# Patient Record
Sex: MALE | Race: WHITE | Employment: FULL TIME | URBAN - METROPOLITAN AREA
[De-identification: names, ages, dates, MRNs, and addresses within clinical notes are randomized per-mention and may not be internally consistent; named-entity substitution may affect disease eponyms.]

---

## 2017-03-06 ENCOUNTER — OFFICE VISIT (OUTPATIENT)
Dept: BEHAVIORAL/MENTAL HEALTH | Age: 32
End: 2017-03-06

## 2017-03-06 DIAGNOSIS — F33.2 MAJOR DEPRESSIVE DISORDER, RECURRENT EPISODE, SEVERE WITH ANXIOUS DISTRESS (HCC): Primary | ICD-10-CM

## 2017-03-06 DIAGNOSIS — F14.20 COCAINE USE DISORDER, MODERATE, DEPENDENCE (HCC): ICD-10-CM

## 2017-03-06 DIAGNOSIS — F12.10 CANNABIS USE DISORDER, MILD, ABUSE: ICD-10-CM

## 2017-03-06 PROCEDURE — 90834 PSYTX W PT 45 MINUTES: CPT | Performed by: PSYCHOLOGIST

## 2017-03-06 PROCEDURE — G0444 DEPRESSION SCREEN ANNUAL: HCPCS | Performed by: PSYCHOLOGIST

## 2017-03-06 ASSESSMENT — PATIENT HEALTH QUESTIONNAIRE - PHQ9
SUM OF ALL RESPONSES TO PHQ QUESTIONS 1-9: 20
9. THOUGHTS THAT YOU WOULD BE BETTER OFF DEAD, OR OF HURTING YOURSELF: 1
8. MOVING OR SPEAKING SO SLOWLY THAT OTHER PEOPLE COULD HAVE NOTICED. OR THE OPPOSITE, BEING SO FIGETY OR RESTLESS THAT YOU HAVE BEEN MOVING AROUND A LOT MORE THAN USUAL: 1
3. TROUBLE FALLING OR STAYING ASLEEP: 3
7. TROUBLE CONCENTRATING ON THINGS, SUCH AS READING THE NEWSPAPER OR WATCHING TELEVISION: 3
6. FEELING BAD ABOUT YOURSELF - OR THAT YOU ARE A FAILURE OR HAVE LET YOURSELF OR YOUR FAMILY DOWN: 2
SUM OF ALL RESPONSES TO PHQ9 QUESTIONS 1 & 2: 6
1. LITTLE INTEREST OR PLEASURE IN DOING THINGS: 3
4. FEELING TIRED OR HAVING LITTLE ENERGY: 1
2. FEELING DOWN, DEPRESSED OR HOPELESS: 3
5. POOR APPETITE OR OVEREATING: 3
10. IF YOU CHECKED OFF ANY PROBLEMS, HOW DIFFICULT HAVE THESE PROBLEMS MADE IT FOR YOU TO DO YOUR WORK, TAKE CARE OF THINGS AT HOME, OR GET ALONG WITH OTHER PEOPLE: 3

## 2017-03-13 ENCOUNTER — OFFICE VISIT (OUTPATIENT)
Dept: BEHAVIORAL/MENTAL HEALTH | Age: 32
End: 2017-03-13

## 2017-03-13 ENCOUNTER — OFFICE VISIT (OUTPATIENT)
Dept: FAMILY MEDICINE CLINIC | Age: 32
End: 2017-03-13

## 2017-03-13 VITALS
BODY MASS INDEX: 23.98 KG/M2 | HEIGHT: 72 IN | DIASTOLIC BLOOD PRESSURE: 72 MMHG | WEIGHT: 177 LBS | SYSTOLIC BLOOD PRESSURE: 100 MMHG | HEART RATE: 76 BPM | RESPIRATION RATE: 16 BRPM

## 2017-03-13 DIAGNOSIS — F10.20 ALCOHOLIC (HCC): ICD-10-CM

## 2017-03-13 DIAGNOSIS — F33.2 MAJOR DEPRESSIVE DISORDER, RECURRENT EPISODE, SEVERE WITH ANXIOUS DISTRESS (HCC): Primary | ICD-10-CM

## 2017-03-13 DIAGNOSIS — F19.94 SUBSTANCE INDUCED MOOD DISORDER (HCC): ICD-10-CM

## 2017-03-13 PROBLEM — F10.90 ALCOHOL USE DISORDER: Status: ACTIVE | Noted: 2017-03-13

## 2017-03-13 PROCEDURE — 99214 OFFICE O/P EST MOD 30 MIN: CPT | Performed by: NURSE PRACTITIONER

## 2017-03-13 PROCEDURE — 90832 PSYTX W PT 30 MINUTES: CPT | Performed by: PSYCHOLOGIST

## 2017-03-13 RX ORDER — DIVALPROEX SODIUM 250 MG/1
250 TABLET, EXTENDED RELEASE ORAL DAILY
Qty: 30 TABLET | Refills: 11 | Status: SHIPPED | OUTPATIENT
Start: 2017-03-13 | End: 2017-10-18 | Stop reason: SDUPTHER

## 2017-03-13 ASSESSMENT — ENCOUNTER SYMPTOMS
ABDOMINAL PAIN: 0
SHORTNESS OF BREATH: 0
COUGH: 0
NAUSEA: 0

## 2017-10-18 ENCOUNTER — OFFICE VISIT (OUTPATIENT)
Dept: FAMILY MEDICINE CLINIC | Age: 32
End: 2017-10-18

## 2017-10-18 VITALS
HEIGHT: 72 IN | RESPIRATION RATE: 16 BRPM | DIASTOLIC BLOOD PRESSURE: 72 MMHG | WEIGHT: 183.9 LBS | HEART RATE: 70 BPM | SYSTOLIC BLOOD PRESSURE: 110 MMHG | BODY MASS INDEX: 24.91 KG/M2 | TEMPERATURE: 98.3 F

## 2017-10-18 DIAGNOSIS — F41.9 ANXIETY: Primary | ICD-10-CM

## 2017-10-18 DIAGNOSIS — F19.94 SUBSTANCE INDUCED MOOD DISORDER (HCC): ICD-10-CM

## 2017-10-18 PROCEDURE — 99213 OFFICE O/P EST LOW 20 MIN: CPT | Performed by: NURSE PRACTITIONER

## 2017-10-18 RX ORDER — DIVALPROEX SODIUM 250 MG/1
250 TABLET, EXTENDED RELEASE ORAL DAILY
Qty: 30 TABLET | Refills: 1 | Status: SHIPPED | OUTPATIENT
Start: 2017-10-18 | End: 2018-03-23

## 2017-10-18 RX ORDER — SERTRALINE HYDROCHLORIDE 100 MG/1
100 TABLET, FILM COATED ORAL DAILY
Qty: 30 TABLET | Refills: 0 | Status: SHIPPED | OUTPATIENT
Start: 2017-10-18 | End: 2018-03-23

## 2017-10-18 ASSESSMENT — ENCOUNTER SYMPTOMS
COUGH: 0
SHORTNESS OF BREATH: 0
NAUSEA: 0
ABDOMINAL PAIN: 0

## 2017-10-18 NOTE — PROGRESS NOTES
sounds. He has no wheezes. He has no rhonchi. Abdominal: Soft. Bowel sounds are normal. There is no tenderness. Neurological: He is alert and oriented to person, place, and time. Psychiatric: Thought content normal. His mood appears anxious. He is slowed and withdrawn. Cognition and memory are normal. He expresses impulsivity. He exhibits a depressed mood. He is inattentive. Assessment:      1. Anxiety  sertraline (ZOLOFT) 100 MG tablet   2.  Substance induced mood disorder (HCC)  divalproex (DEPAKOTE ER) 250 MG extended release tablet           Plan:      Restart Depakote  Zoloft 100 mg for anxiety control  Information for [employer] for Drug and Alcohol Help  Had appt with YOUSUF for counseling - continue  RTO in 1 month

## 2017-11-15 ENCOUNTER — TELEPHONE (OUTPATIENT)
Dept: FAMILY MEDICINE CLINIC | Age: 32
End: 2017-11-15

## 2017-12-12 ENCOUNTER — TELEPHONE (OUTPATIENT)
Dept: FAMILY MEDICINE CLINIC | Age: 32
End: 2017-12-12

## 2018-03-23 ENCOUNTER — HOSPITAL ENCOUNTER (INPATIENT)
Age: 33
LOS: 1 days | Discharge: HOME | DRG: 897 | End: 2018-03-24
Attending: EMERGENCY MEDICINE | Admitting: PSYCHIATRY & NEUROLOGY

## 2018-03-23 DIAGNOSIS — F10.920 ACUTE ALCOHOLIC INTOXICATION WITHOUT COMPLICATION (HCC): ICD-10-CM

## 2018-03-23 DIAGNOSIS — F19.10 POLYSUBSTANCE ABUSE (HCC): ICD-10-CM

## 2018-03-23 DIAGNOSIS — F32.A DEPRESSION WITH SUICIDAL IDEATION: ICD-10-CM

## 2018-03-23 DIAGNOSIS — R45.851 DEPRESSION WITH SUICIDAL IDEATION: ICD-10-CM

## 2018-03-23 DIAGNOSIS — F19.94 SUBSTANCE INDUCED MOOD DISORDER (HCC): Primary | ICD-10-CM

## 2018-03-23 DIAGNOSIS — F10.10 ALCOHOL ABUSE: ICD-10-CM

## 2018-03-23 LAB
ACETAMINOPHEN LEVEL: < 5 UG/ML (ref 0–20)
ALBUMIN SERPL-MCNC: 4.5 G/DL (ref 3.5–5.1)
ALP BLD-CCNC: 92 U/L (ref 38–126)
ALT SERPL-CCNC: 15 U/L (ref 11–66)
AMPHETAMINE+METHAMPHETAMINE URINE SCREEN: NEGATIVE
ANION GAP SERPL CALCULATED.3IONS-SCNC: 15 MEQ/L (ref 8–16)
AST SERPL-CCNC: 18 U/L (ref 5–40)
BARBITURATE QUANTITATIVE URINE: NEGATIVE
BASOPHILS # BLD: 0.3 %
BASOPHILS ABSOLUTE: 0 THOU/MM3 (ref 0–0.1)
BENZODIAZEPINE QUANTITATIVE URINE: NEGATIVE
BILIRUB SERPL-MCNC: 0.4 MG/DL (ref 0.3–1.2)
BUN BLDV-MCNC: 7 MG/DL (ref 7–22)
CALCIUM SERPL-MCNC: 9.4 MG/DL (ref 8.5–10.5)
CANNABINOID QUANTITATIVE URINE: POSITIVE
CHLORIDE BLD-SCNC: 100 MEQ/L (ref 98–111)
CO2: 26 MEQ/L (ref 23–33)
COCAINE METABOLITE QUANTITATIVE URINE: POSITIVE
CREAT SERPL-MCNC: 0.7 MG/DL (ref 0.4–1.2)
EOSINOPHIL # BLD: 1 %
EOSINOPHILS ABSOLUTE: 0.1 THOU/MM3 (ref 0–0.4)
ETHYL ALCOHOL, SERUM: 0.07 %
ETHYL ALCOHOL, SERUM: 0.16 %
GFR SERPL CREATININE-BSD FRML MDRD: > 90 ML/MIN/1.73M2
GLUCOSE BLD-MCNC: 107 MG/DL (ref 70–108)
HCT VFR BLD CALC: 50.3 % (ref 42–52)
HEMOGLOBIN: 16.9 GM/DL (ref 14–18)
LYMPHOCYTES # BLD: 33.4 %
LYMPHOCYTES ABSOLUTE: 3.7 THOU/MM3 (ref 1–4.8)
MCH RBC QN AUTO: 29 PG (ref 27–31)
MCHC RBC AUTO-ENTMCNC: 33.5 GM/DL (ref 33–37)
MCV RBC AUTO: 86.6 FL (ref 80–94)
MONOCYTES # BLD: 9 %
MONOCYTES ABSOLUTE: 1 THOU/MM3 (ref 0.4–1.3)
NUCLEATED RED BLOOD CELLS: 0 /100 WBC
OPIATES, URINE: NEGATIVE
OSMOLALITY CALCULATION: 279.7 MOSMOL/KG (ref 275–300)
OXYCODONE: NEGATIVE
PDW BLD-RTO: 14.3 % (ref 11.5–14.5)
PHENCYCLIDINE QUANTITATIVE URINE: NEGATIVE
PLATELET # BLD: 295 THOU/MM3 (ref 130–400)
PMV BLD AUTO: 8.1 FL (ref 7.4–10.4)
POTASSIUM SERPL-SCNC: 3.3 MEQ/L (ref 3.5–5.2)
RBC # BLD: 5.82 MILL/MM3 (ref 4.7–6.1)
SALICYLATE, SERUM: 0.3 MG/DL (ref 2–10)
SEG NEUTROPHILS: 56.3 %
SEGMENTED NEUTROPHILS ABSOLUTE COUNT: 6.2 THOU/MM3 (ref 1.8–7.7)
SODIUM BLD-SCNC: 141 MEQ/L (ref 135–145)
TOTAL PROTEIN: 7.5 G/DL (ref 6.1–8)
TSH SERPL DL<=0.05 MIU/L-ACNC: 1.58 UIU/ML (ref 0.4–4.2)
VALPROIC ACID LEVEL: < 2.8 UG/ML (ref 50–100)
WBC # BLD: 11 THOU/MM3 (ref 4.8–10.8)

## 2018-03-23 PROCEDURE — 36415 COLL VENOUS BLD VENIPUNCTURE: CPT

## 2018-03-23 PROCEDURE — 80164 ASSAY DIPROPYLACETIC ACD TOT: CPT

## 2018-03-23 PROCEDURE — 6370000000 HC RX 637 (ALT 250 FOR IP): Performed by: PSYCHIATRY & NEUROLOGY

## 2018-03-23 PROCEDURE — G0480 DRUG TEST DEF 1-7 CLASSES: HCPCS

## 2018-03-23 PROCEDURE — 80307 DRUG TEST PRSMV CHEM ANLYZR: CPT

## 2018-03-23 PROCEDURE — 1240000000 HC EMOTIONAL WELLNESS R&B

## 2018-03-23 PROCEDURE — 99285 EMERGENCY DEPT VISIT HI MDM: CPT

## 2018-03-23 PROCEDURE — 80053 COMPREHEN METABOLIC PANEL: CPT

## 2018-03-23 PROCEDURE — 84443 ASSAY THYROID STIM HORMONE: CPT

## 2018-03-23 PROCEDURE — 85025 COMPLETE CBC W/AUTO DIFF WBC: CPT

## 2018-03-23 RX ORDER — HYDROXYZINE PAMOATE 25 MG/1
25 CAPSULE ORAL 3 TIMES DAILY PRN
Status: DISCONTINUED | OUTPATIENT
Start: 2018-03-23 | End: 2018-03-24 | Stop reason: HOSPADM

## 2018-03-23 RX ORDER — TRAZODONE HYDROCHLORIDE 50 MG/1
50 TABLET ORAL NIGHTLY PRN
Status: DISCONTINUED | OUTPATIENT
Start: 2018-03-23 | End: 2018-03-24 | Stop reason: HOSPADM

## 2018-03-23 RX ORDER — SODIUM CHLORIDE 0.9 % (FLUSH) 0.9 %
10 SYRINGE (ML) INJECTION EVERY 12 HOURS SCHEDULED
Status: CANCELLED | OUTPATIENT
Start: 2018-03-23

## 2018-03-23 RX ORDER — MAGNESIUM HYDROXIDE/ALUMINUM HYDROXICE/SIMETHICONE 120; 1200; 1200 MG/30ML; MG/30ML; MG/30ML
30 SUSPENSION ORAL PRN
Status: DISCONTINUED | OUTPATIENT
Start: 2018-03-23 | End: 2018-03-24 | Stop reason: HOSPADM

## 2018-03-23 RX ORDER — ACETAMINOPHEN 325 MG/1
650 TABLET ORAL EVERY 4 HOURS PRN
Status: CANCELLED | OUTPATIENT
Start: 2018-03-23

## 2018-03-23 RX ORDER — ACETAMINOPHEN 325 MG/1
650 TABLET ORAL EVERY 4 HOURS PRN
Status: DISCONTINUED | OUTPATIENT
Start: 2018-03-23 | End: 2018-03-24 | Stop reason: HOSPADM

## 2018-03-23 RX ORDER — SODIUM CHLORIDE 0.9 % (FLUSH) 0.9 %
10 SYRINGE (ML) INJECTION PRN
Status: CANCELLED | OUTPATIENT
Start: 2018-03-23

## 2018-03-23 RX ADMIN — TRAZODONE HYDROCHLORIDE 50 MG: 50 TABLET ORAL at 21:04

## 2018-03-23 RX ADMIN — HYDROXYZINE PAMOATE 25 MG: 25 CAPSULE ORAL at 21:04

## 2018-03-23 ASSESSMENT — ENCOUNTER SYMPTOMS
COUGH: 0
BACK PAIN: 0
NAUSEA: 0
DIARRHEA: 0
VOMITING: 0
SORE THROAT: 0
EYE DISCHARGE: 0
SHORTNESS OF BREATH: 0
RHINORRHEA: 0
ABDOMINAL PAIN: 0
WHEEZING: 0
EYE REDNESS: 0

## 2018-03-23 ASSESSMENT — SLEEP AND FATIGUE QUESTIONNAIRES
SLEEP PATTERN: INSOMNIA
SLEEP PATTERN: INSOMNIA
DO YOU HAVE DIFFICULTY SLEEPING: YES
DIFFICULTY STAYING ASLEEP: YES
DIFFICULTY FALLING ASLEEP: YES
DIFFICULTY ARISING: YES
DO YOU USE A SLEEP AID: NO
DO YOU HAVE DIFFICULTY SLEEPING: YES
DIFFICULTY STAYING ASLEEP: YES
DIFFICULTY ARISING: YES
DIFFICULTY FALLING ASLEEP: YES
AVERAGE NUMBER OF SLEEP HOURS: 3
RESTFUL SLEEP: NO
RESTFUL SLEEP: NO
DO YOU USE A SLEEP AID: NO

## 2018-03-23 ASSESSMENT — PATIENT HEALTH QUESTIONNAIRE - PHQ9: SUM OF ALL RESPONSES TO PHQ QUESTIONS 1-9: 18

## 2018-03-23 ASSESSMENT — LIFESTYLE VARIABLES
HISTORY_ALCOHOL_USE: YES
HISTORY_ALCOHOL_USE: YES

## 2018-03-23 NOTE — ED NOTES
Pt is resting quietly on cart on cart with campus police observing the patient for their safety. Will continue to monitor the patient.      Justin Whitley RN  03/23/18 9578

## 2018-03-23 NOTE — ED PROVIDER NOTES
Patient signed over from outgoing physician Dr. Aroldo Elizalde. Patient was seen and examined by me, with sad affect with some tearing and obvious depression. Patient was intoxicated with suicidal ideation, cocaine and marijuana and urine and mild low potassium at 3.3. Initial blood alcohol  At 0319am was AM was 0.16 and repeated 0715AM was 0.07. Patient has remained stable throughout his ED stay. Patient evaluated by behavior assessment team and will be admitted to inpatient psychiatry under the service of .        Nirav Portillo MD  03/23/18 6414 [facility] Zack Cody MD  03/23/18 2104
2:58 AM    Patient is seen and evaluated in a timely fashion. MDM    UDS positive for Marijuana and Cocaine. ETOH level 0.16 at 0319 is 0.16 and this will be rechecked at 8:00 AM    Other labs are reassuring. Signed out to next shift physician. Pending GIANFRANCO evaluation when he is sober. CRITICAL CARE:   None    CONSULTS:  GIANFRANCO    PROCEDURES:  None    FINAL IMPRESSION      1. Substance induced mood disorder ([facility]    2. Acute alcoholic intoxication without complication ([facility] [facility]    3. Alcohol abuse    4. Polysubstance abuse          DISPOSITION/PLAN   Signed out. PATIENT REFERRED TO:  No follow-up provider specified. DISCHARGE MEDICATIONS:  New Prescriptions    No medications on file       (Please note that portions of this note were completed with a voice recognition program.  Efforts were made to edit the dictations but occasionally words are mis-transcribed.)  Scribe:  Sami Greene 3/23/18 3:11 AM Scribing for and in the presence of Del Moise MD.    Signed by: Candelario Walker, 03/23/18 6:47 AM    Provider:  I personally performed the services described in the documentation, reviewed and edited the documentation which was dictated to the scribe in my presence, and it accurately records my words and actions.     Del Moise MD 3/23/18 6:47 AM    MD Del Lester MD  03/23/18 4138

## 2018-03-23 NOTE — ED NOTES
Pt is resting quietly on cart with easy respirations and no voiced concerns. Pt is updated on POC and pending lab results. Will continue to monitor the patient.      Christal Orantes RN  03/23/18 0183

## 2018-03-23 NOTE — ED NOTES
Pt requesting to see the doctor- any doctor- reports that he cannot and will not stay here and that he is being held against his will at this time. Explained the [facility] protocols and that only the psychiatrist can lift the hold. Pt given phone to use to call work- given something to drink-updated on POC. Will monitor.       Noel Prescott RN  03/23/18 2032

## 2018-03-23 NOTE — Clinical Note
Patient Class: Inpatient [101]   REQUIRED: Diagnosis: Depression affecting pregnancy [5289072]   Estimated Length of Stay: Estimated stay of more than 2 midnights   Future Attending Provider: Alexandria Dorsey [[id number]]   Admitting Provider: Alexandria Dorsey [[id number]]   Preferred Department: psychiatry   Telemetry Bed Required?: No

## 2018-03-24 VITALS
HEIGHT: 73 IN | WEIGHT: 170 LBS | DIASTOLIC BLOOD PRESSURE: 84 MMHG | BODY MASS INDEX: 22.53 KG/M2 | RESPIRATION RATE: 16 BRPM | SYSTOLIC BLOOD PRESSURE: 131 MMHG | OXYGEN SATURATION: 98 % | HEART RATE: 78 BPM | TEMPERATURE: 97.1 F

## 2018-03-24 PROCEDURE — 99238 HOSP IP/OBS DSCHRG MGMT 30/<: CPT | Performed by: PSYCHIATRY & NEUROLOGY

## 2018-03-24 PROCEDURE — 5130000000 HC BRIDGE APPOINTMENT

## 2018-03-24 PROCEDURE — 90792 PSYCH DIAG EVAL W/MED SRVCS: CPT | Performed by: PSYCHIATRY & NEUROLOGY

## 2018-03-24 NOTE — PLAN OF CARE
Problem: Depressive Behavior With or Without Suicide Precautions:  Goal: Able to verbalize acceptance of life and situations over which he or she has no control  Able to verbalize acceptance of life and situations over which he or she has no control   Outcome: Ongoing  Patient verbalizes fair acceptance of situations over which he has no control. Goal: Able to verbalize and/or display a decrease in depressive symptoms  Able to verbalize and/or display a decrease in depressive symptoms   Outcome: Ongoing  Patient noted with a blunt affect and brightens slightly with interaction. Goal: Able to verbalize support systems  Able to verbalize support systems   Outcome: Ongoing  Patient states his mother and friends are his current support system. Goal: Absence of self-harm  Absence of self-harm   Outcome: Ongoing  No self harm noted so far this shift. Goal: Patient specific goal  Patient specific goal   Outcome: Ongoing  Patient did not have a goal this shift. Goal: Participates in care planning  Participates in care planning   Outcome: Ongoing  Care plan reviewed with patient and fair understanding verbalized. Problem: Substance Abuse:  Goal: Absence of drug withdrawal signs and symptoms  Absence of drug withdrawal signs and symptoms   Outcome: Ongoing  No withdrawal symptoms noted so far this shift. Problem: Discharge Planning:  Goal: Discharged to appropriate level of care  Discharged to appropriate level of care   Outcome: Ongoing  Patient states he plans to return home alone at discharge. Problem: KNOWLEDGE DEFICIT,EDUCATION,DISCHARGE PLAN  Goal: Knowledge - personal safety  Outcome: Ongoing  Patient maintained in a safe environment. 15 minute visual checks continued.

## 2018-03-24 NOTE — PLAN OF CARE
Problem: KNOWLEDGE DEFICIT,EDUCATION,DISCHARGE PLAN  Goal: Knowledge - personal safety    Intervention: Educate safety plan  1. What are the warning signs when you begin thinking suicide or when you feel very distressed? DID NOT ANSWER  2. What can you do by yourself to take your mind off of the problem? DID NOT ANSWER  3. If you are unable to deal with your distressed mood alone, contact trusted family members or friends. List several people in case your first choices are not available. linda jeronimo , micaela wilks , geovanni TORRES   4. Contact local professionals or emergency services if you continue to have suicidal thoughts or serious distress.   DID NOT ANSWER    SUICIDE PREVENTION [place] PHONE NUMBERS:        [phone]        [phone] (for deaf or hearing impaired)

## 2018-03-24 NOTE — PROGRESS NOTES
Behavioral Health   Discharge Note    Pt discharged with followings belongings:   Dentures: None  Vision - Corrective Lenses: None  Hearing Aid: None  Jewelry: None  Body Piercings Removed: N/A  Clothing: Footwear, Shirt, Undergarments (Comment), Other (Comment)  Were All Patient Medications Collected?: Not Applicable  Other Valuables: Cell phone   Valuables sent home with patient. Valuables retrieved from safe, Security envelope number:  [id number] and returned to patient. Patient left department with Departure Mode: In cab, By self via Mobility at Departure: Ambulatory, discharged to Discharged to: Private Residence. \"An Important Message from Bertha Taylor About Your Rights\" form reviewed, signed N/A. Patient education on aftercare instructions: YES  Bridge Appointment completed: Reviewed Discharge Instructions with patient. Patient verbalizes understanding and agreement with the discharge plan using the teachback method. Patient verbalize understanding of AVS:  YES.     Status EXAM upon discharge:  Status and Exam  Normal: No  Facial Expression: Flat, Brightened  Affect: Blunt  Level of Consciousness: Alert  Mood:Normal: No  Mood: Depressed, Anxious  Motor Activity:Normal: No  Motor Activity: Decreased  Interview Behavior: Cooperative  Preception: [place] to Person, Lenon Brill to Time, [place] to Place, [place] to Situation  Attention:Normal: Yes  Thought Processes: Flt.of Ideas  Thought Content:Normal: Yes  Hallucinations: None  Delusions: No  Memory:Normal: Yes  Memory: Poor Recent, Poor Remote  Insight and Judgment: No  Insight and Judgment: Poor Judgment, Poor Insight  Present Suicidal Ideation: No  Present Homicidal Ideation: No    Christopher Gil RN
Nutrition Assessment    Type and Reason for Visit: Initial, Positive Nutrition Screen (weight loss;poor po intake)    Malnutrition Assessment:  · Malnutrition Status: No malnutrition    Nutrition Diagnosis:   · Problem: No nutrition diagnosis at this time    Nutrition Assessment:  · Subjective Assessment: Pt. being DC soon; ate well here; admit with suicidal ideations; only stated weight available; pt. appears well nourished; BMI 22.5 on stated wt. Nutrition Risk Level   Risk Level: Low    Nutrition Intervention  Food and/or Delivery: Continue current diet  Nutrition Education/Counseling/Coordination of Care:  Continued Inpatient Monitoring, Education Not Indicated    Patient assessed for nutrition risk. Deemed to be at low risk at this time. Will continue to follow patient.       Electronically signed by Diana Pastor RD, ELISSA on 3/24/18 at 1:39 PM    Contact Number: [phone]
Patient lying on bed speaking on phone. Denies needs.
and copy of patient rights given. Received admission packet: yes Consents reviewed, signed yes. Patient verbalize understanding: yes  Patient education on precautions:yes          Provided pt with [employer] handout entitled \"Quitting Smoking. \"  Reviewed handout with pt addressing dangers of smoking, developing coping skills, and providing basic information about quitting. Pt response to counseling:  Verbal understanding of reasons to quit.             Moe Whitaker RN

## 2018-03-26 ENCOUNTER — TELEPHONE (OUTPATIENT)
Dept: FAMILY MEDICINE CLINIC | Age: 33
End: 2018-03-26

## 2018-03-26 NOTE — TELEPHONE ENCOUNTER
[facility] Transitions Initial Follow Up Call    Call within 2 business days of discharge: Yes    Patient: Vivien Bojorquez Patient : 1985   MRN: [id number]  Reason for Admission: There are no discharge diagnoses documented for the most recent discharge. Discharge Date: 3/24/18 RARS: [facility] Risk Score: 2.5     Spoke with:   Left message on v/m    Facility: [unfilled]    Non-face-to-face services provided: Follow Up  No future appointments.     Jeniffer Salazar RN

## 2018-03-27 ENCOUNTER — TELEPHONE (OUTPATIENT)
Dept: FAMILY MEDICINE CLINIC | Age: 33
End: 2018-03-27

## 2019-10-16 ENCOUNTER — HOSPITAL ENCOUNTER (EMERGENCY)
Age: 34
Discharge: HOME | End: 2019-10-17

## 2019-10-16 VITALS
HEART RATE: 97 BPM | WEIGHT: 165 LBS | DIASTOLIC BLOOD PRESSURE: 55 MMHG | HEIGHT: 72 IN | TEMPERATURE: 98.3 F | OXYGEN SATURATION: 99 % | RESPIRATION RATE: 17 BRPM | SYSTOLIC BLOOD PRESSURE: 105 MMHG | BODY MASS INDEX: 22.35 KG/M2

## 2019-10-16 DIAGNOSIS — S61.411A LACERATION OF RIGHT HAND WITHOUT FOREIGN BODY, INITIAL ENCOUNTER: Primary | ICD-10-CM

## 2019-10-16 PROCEDURE — 99282 EMERGENCY DEPT VISIT SF MDM: CPT

## 2019-10-16 PROCEDURE — 12002 RPR S/N/AX/GEN/TRNK2.6-7.5CM: CPT

## 2019-10-16 ASSESSMENT — PAIN SCALES - GENERAL: PAINLEVEL_OUTOF10: 8

## 2019-10-16 ASSESSMENT — PAIN DESCRIPTION - PROGRESSION: CLINICAL_PROGRESSION: NOT CHANGED

## 2019-10-16 ASSESSMENT — PAIN DESCRIPTION - LOCATION: LOCATION: HAND

## 2019-10-16 ASSESSMENT — PAIN DESCRIPTION - DESCRIPTORS: DESCRIPTORS: SHARP

## 2019-10-16 ASSESSMENT — PAIN DESCRIPTION - ONSET: ONSET: ON-GOING

## 2019-10-16 ASSESSMENT — PAIN DESCRIPTION - PAIN TYPE: TYPE: ACUTE PAIN

## 2019-10-16 ASSESSMENT — PAIN DESCRIPTION - FREQUENCY: FREQUENCY: CONTINUOUS

## 2019-10-16 ASSESSMENT — PAIN DESCRIPTION - ORIENTATION: ORIENTATION: RIGHT

## 2019-10-17 ENCOUNTER — TELEPHONE (OUTPATIENT)
Dept: FAMILY MEDICINE CLINIC | Age: 34
End: 2019-10-17

## 2019-10-17 PROCEDURE — 6360000002 HC RX W HCPCS: Performed by: NURSE PRACTITIONER

## 2019-10-17 PROCEDURE — 90715 TDAP VACCINE 7 YRS/> IM: CPT | Performed by: NURSE PRACTITIONER

## 2019-10-17 PROCEDURE — 90471 IMMUNIZATION ADMIN: CPT | Performed by: NURSE PRACTITIONER

## 2019-10-17 RX ORDER — CEPHALEXIN 500 MG/1
500 CAPSULE ORAL 3 TIMES DAILY
Qty: 21 CAPSULE | Refills: 0 | Status: SHIPPED | OUTPATIENT
Start: 2019-10-17 | End: 2019-10-24

## 2019-10-17 RX ORDER — LIDOCAINE HYDROCHLORIDE 10 MG/ML
INJECTION, SOLUTION INFILTRATION; PERINEURAL
Status: DISCONTINUED
Start: 2019-10-17 | End: 2019-10-17 | Stop reason: HOSPADM

## 2019-10-17 RX ADMIN — TETANUS TOXOID, REDUCED DIPHTHERIA TOXOID AND ACELLULAR PERTUSSIS VACCINE, ADSORBED 0.5 ML: 5; 2.5; 8; 8; 2.5 SUSPENSION INTRAMUSCULAR at 01:15

## 2020-04-15 ENCOUNTER — HOSPITAL ENCOUNTER (EMERGENCY)
Age: 35
Discharge: HOME | End: 2020-04-15
Payer: MEDICARE

## 2020-04-15 VITALS
RESPIRATION RATE: 16 BRPM | HEIGHT: 73 IN | SYSTOLIC BLOOD PRESSURE: 123 MMHG | DIASTOLIC BLOOD PRESSURE: 77 MMHG | TEMPERATURE: 98.3 F | BODY MASS INDEX: 25.18 KG/M2 | WEIGHT: 190 LBS | HEART RATE: 90 BPM | OXYGEN SATURATION: 100 %

## 2020-04-15 PROCEDURE — 99213 OFFICE O/P EST LOW 20 MIN: CPT | Performed by: NURSE PRACTITIONER

## 2020-04-15 PROCEDURE — 99213 OFFICE O/P EST LOW 20 MIN: CPT

## 2020-04-15 RX ORDER — PREDNISONE 10 MG/1
TABLET ORAL
Qty: 21 TABLET | Refills: 0 | Status: SHIPPED | OUTPATIENT
Start: 2020-04-15 | End: 2020-06-11

## 2020-04-15 RX ORDER — CYCLOBENZAPRINE HCL 5 MG
5 TABLET ORAL 2 TIMES DAILY PRN
Qty: 8 TABLET | Refills: 0 | Status: SHIPPED | OUTPATIENT
Start: 2020-04-15 | End: 2020-04-19

## 2020-04-15 ASSESSMENT — PAIN SCALES - GENERAL: PAINLEVEL_OUTOF10: 5

## 2020-04-15 ASSESSMENT — PAIN - FUNCTIONAL ASSESSMENT: PAIN_FUNCTIONAL_ASSESSMENT: PREVENTS OR INTERFERES SOME ACTIVE ACTIVITIES AND ADLS

## 2020-04-15 ASSESSMENT — PAIN DESCRIPTION - ORIENTATION: ORIENTATION: LEFT

## 2020-04-15 ASSESSMENT — PAIN DESCRIPTION - PAIN TYPE: TYPE: ACUTE PAIN

## 2020-04-15 ASSESSMENT — PAIN DESCRIPTION - LOCATION: LOCATION: SHOULDER;BACK

## 2020-04-15 ASSESSMENT — PAIN DESCRIPTION - FREQUENCY: FREQUENCY: CONTINUOUS

## 2020-04-15 NOTE — ED PROVIDER NOTES
[facility]       CHIEF COMPLAINT       Chief Complaint   Patient presents with    Shoulder Pain     upper left back/shoulder pain       Nurses Notes reviewed and I agree except as noted in the HPI. HISTORY OF PRESENT ILLNESS   Jacinda Burt is a 38 y.o. male who presents     Patient states that about an hour or 2 ago he was turning his torso to reach around in his vehicle, and felt a sharp pain to left posterior shoulder. He does have full range of motion to left arm, however he states that there is tenderness when lying on back. No bruising, lacerations, or open gashes are noted. Denies hearing any pop. REVIEW OF SYSTEMS     Review of Systems   Constitutional: Negative for chills, fatigue and fever. Musculoskeletal: Positive for arthralgias (left shoulder/ posterior). Skin: Negative for rash and wound. Neurological: Negative for weakness and numbness. PAST MEDICAL HISTORY         Diagnosis Date    Anxiety     Depression        SURGICALHISTORY     Patient  has a past surgical history that includes Hand surgery (Right, 2004). CURRENT MEDICATIONS       Discharge Medication List as of 4/15/2020  1:54 PM          ALLERGIES     Patient is has No Known Allergies. Patients   Immunization History   Administered Date(s) Administered    Tdap (Boostrix, Adacel) 10/17/2019       FAMILY HISTORY     Patient's family history includes Arthritis in his maternal grandmother; Depression in his father and mother; Diabetes in his maternal grandmother; Early Death in his father; Heart Disease in his father and mother; Substance Abuse in his father, maternal uncle, and paternal uncle. SOCIAL HISTORY     Patient  reports that he has been smoking. He has been smoking about 1.00 pack per day. His smokeless tobacco use includes chew. He reports current alcohol use. He reports current drug use. Drug: Marijuana.     PHYSICAL EXAM     ED TRIAGE VITALS  BP: TO:  ELAINA Shankar CNP  [address] [address]      DISCHARGE MEDICATIONS:  Discharge Medication List as of 4/15/2020  1:54 PM      START taking these medications    Details   predniSONE (DELTASONE) 10 MG tablet 60 mg for day 1, 50 mg for day 2, 40 mg for day 3, 30 mg for day 4, 20 mg for day 5, 10 mg for day 6, Disp-21 tablet, R-0Print      cyclobenzaprine (FLEXERIL) 5 MG tablet Take 1 tablet by mouth 2 times daily as needed for Muscle spasms, Disp-8 tablet, R-0Print             Discharge Medication List as of 4/15/2020  1:54 PM          Discharge Medication List as of 4/15/2020  1:54 PM          ELAINA Grissom NP    (Please note that portions of this note were completed with a voice recognition program. Efforts were made to edit the dictations but occasionally words are mis-transcribed.)         ELAINA Patel NP  04/15/20 9040

## 2020-04-15 NOTE — ED TRIAGE NOTES
Patient ambulated to rm 2. Patient states today at 1230, while out shopping, turned body while in car, reaching across body to reach for phone, felt something pop under left shoulder blade.

## 2020-04-15 NOTE — ED NOTES
Patient understood instructions verbally,  Follow up with PCP with any concerns, or worse back pain  follow up with ED. E-script, ambulated self to lobby,stable condition.       Aby Rodriguez LPN  45/83/93 1191 29-Sep-2018

## 2020-04-16 ENCOUNTER — TELEPHONE (OUTPATIENT)
Dept: FAMILY MEDICINE CLINIC | Age: 35
End: 2020-04-16

## 2020-05-25 ENCOUNTER — HOSPITAL ENCOUNTER (EMERGENCY)
Age: 35
Discharge: HOME | End: 2020-05-25
Payer: MEDICARE

## 2020-05-25 VITALS
RESPIRATION RATE: 18 BRPM | HEART RATE: 72 BPM | WEIGHT: 190 LBS | DIASTOLIC BLOOD PRESSURE: 66 MMHG | OXYGEN SATURATION: 97 % | HEIGHT: 73 IN | SYSTOLIC BLOOD PRESSURE: 124 MMHG | TEMPERATURE: 97.2 F | BODY MASS INDEX: 25.18 KG/M2

## 2020-05-25 PROCEDURE — 99211 OFF/OP EST MAY X REQ PHY/QHP: CPT

## 2020-05-25 PROCEDURE — 90471 IMMUNIZATION ADMIN: CPT | Performed by: NURSE PRACTITIONER

## 2020-05-25 PROCEDURE — 6360000002 HC RX W HCPCS: Performed by: NURSE PRACTITIONER

## 2020-05-25 PROCEDURE — 99214 OFFICE O/P EST MOD 30 MIN: CPT | Performed by: NURSE PRACTITIONER

## 2020-05-25 PROCEDURE — 90715 TDAP VACCINE 7 YRS/> IM: CPT | Performed by: NURSE PRACTITIONER

## 2020-05-25 RX ORDER — CEPHALEXIN 500 MG/1
500 CAPSULE ORAL 4 TIMES DAILY
Qty: 40 CAPSULE | Refills: 0 | Status: SHIPPED | OUTPATIENT
Start: 2020-05-25 | End: 2020-06-04

## 2020-05-25 RX ADMIN — TETANUS TOXOID, REDUCED DIPHTHERIA TOXOID AND ACELLULAR PERTUSSIS VACCINE, ADSORBED 0.5 ML: 5; 2.5; 8; 8; 2.5 SUSPENSION INTRAMUSCULAR at 14:29

## 2020-05-25 ASSESSMENT — ENCOUNTER SYMPTOMS
VOMITING: 0
ABDOMINAL PAIN: 0
NAUSEA: 0
COLOR CHANGE: 1
ALLERGIC/IMMUNOLOGIC NEGATIVE: 1

## 2020-05-25 ASSESSMENT — PAIN DESCRIPTION - LOCATION: LOCATION: LEG

## 2020-05-25 ASSESSMENT — PAIN DESCRIPTION - ORIENTATION: ORIENTATION: RIGHT

## 2020-05-25 ASSESSMENT — PAIN DESCRIPTION - DESCRIPTORS: DESCRIPTORS: ACHING;BURNING

## 2020-05-25 ASSESSMENT — PAIN DESCRIPTION - PAIN TYPE: TYPE: ACUTE PAIN

## 2020-05-25 ASSESSMENT — PAIN SCALES - GENERAL: PAINLEVEL_OUTOF10: 4

## 2020-05-25 NOTE — ED PROVIDER NOTES
which have NOT CHANGED    Details   predniSONE (DELTASONE) 10 MG tablet 60 mg for day 1, 50 mg for day 2, 40 mg for day 3, 30 mg for day 4, 20 mg for day 5, 10 mg for day 6, Disp-21 tablet, R-0Print             ALLERGIES     Patient is has No Known Allergies. FAMILY HISTORY     Patient'sfamily history includes Arthritis in his maternal grandmother; Depression in his father and mother; Diabetes in his maternal grandmother; Early Death in his father; Heart Disease in his father and mother; Substance Abuse in his father, maternal uncle, and paternal uncle. SOCIAL HISTORY     Patient  reports that he has been smoking. He has been smoking about 1.00 pack per day. His smokeless tobacco use includes chew. He reports current alcohol use. He reports current drug use. Drug: Marijuana. PHYSICAL EXAM     ED TRIAGE VITALS  BP: 124/66, Temp: 97.2 °F (36.2 °C), Pulse: 72, Resp: 18, SpO2: 97 %  Physical Exam  Vitals signs and nursing note reviewed. Constitutional:       General: He is not in acute distress. Appearance: Normal appearance. He is well-developed and well-groomed. He is not ill-appearing, toxic-appearing or diaphoretic. HENT:      Head: Normocephalic and atraumatic. Right Ear: Hearing normal.      Left Ear: Hearing normal.   Eyes:      General: Lids are normal.      Conjunctiva/sclera:      Right eye: Right conjunctiva is not injected. Left eye: Left conjunctiva is not injected. Pupils: Pupils are equal.   Neck:      Musculoskeletal: Normal range of motion. Cardiovascular:      Rate and Rhythm: Normal rate and regular rhythm. Pulses:           Dorsalis pedis pulses are 2+ on the right side. Pulmonary:      Effort: Pulmonary effort is normal. No respiratory distress. Breath sounds: Normal breath sounds and air entry. No stridor, decreased air movement or transmitted upper airway sounds. No decreased breath sounds, wheezing, rhonchi or rales.    Musculoskeletal: Normal range of motion. General: No swelling. Right knee: Normal. He exhibits normal range of motion and no swelling. No tenderness found. Left knee: He exhibits normal range of motion. Right lower leg: Normal. He exhibits no tenderness, no bony tenderness, no swelling, no deformity and no laceration. No edema. Lymphadenopathy:      Cervical: No cervical adenopathy. Skin:     General: Skin is warm and dry. Capillary Refill: Capillary refill takes less than 2 seconds. Coloration: Skin is not pale. Findings: Abrasion (superficial abrasion to right anterior lower leg, scabbed over. Nontender.  ), erythema (8 cm x 6 cm redness, slightly warm. no streaking, no drainage.  ) and wound present. No abscess, bruising, ecchymosis, signs of injury, petechiae or rash. Neurological:      Mental Status: He is alert and oriented to person, place, and time. Sensory: No sensory deficit. Psychiatric:         Behavior: Behavior normal. Behavior is cooperative. DIAGNOSTIC RESULTS   Labs:No results found for this visit on 05/25/20. IMAGING:  No orders to display     URGENT CARE COURSE:     Vitals:    05/25/20 1408   BP: 124/66   Pulse: 72   Resp: 18   Temp: 97.2 °F (36.2 °C)   TempSrc: Temporal   SpO2: 97%   Weight: 190 lb (86.2 kg)   Height: 6' 1\" (1.854 m)       Medications   Tetanus-Diphth-Acell Pertussis (BOOSTRIX) injection 0.5 mL (0.5 mLs Intramuscular Given 5/25/20 1429)     PROCEDURES:  FINALIMPRESSION      1. Cellulitis of right anterior lower leg    2. Abrasion of leg with infection, right, initial encounter        DISPOSITION/PLAN   DISPOSITION Decision To Discharge 05/25/2020 02:27:26 PM    Tetanus booster was given during encounter  He will start on Keflex and Bactroban ointment to the right anterior lower leg. He is afebrile and nontoxic in appearance.              Problem List Items Addressed This Visit     None      Visit Diagnoses     Cellulitis of right anterior lower leg -  Primary    Relevant Medications    cephALEXin (KEFLEX) 500 MG capsule    mupirocin (BACTROBAN) 2 % ointment    Abrasion of leg with infection, right, initial encounter        Relevant Medications    cephALEXin (KEFLEX) 500 MG capsule    mupirocin (BACTROBAN) 2 % ointment        Stop the hydrogen peroxide  Use antibacterial soap such as Hibiclens or Dial liquid avoid bar soap avoid rubbing the area.   Cleanse the area twice a day pat dry cover the area with a large nonstick dressing and paper tape avoid friction to the area  May elevate lower leg for swelling, pain  Start antibiotic today  Monitor for purulent drainage or puslike drainage, red streaking, fevers, warmth or any new or worsening symptoms follow-up sooner      PATIENT REFERRED TO:  ELAINA Gaffney - CNP  [address] [place] [address]  [address] [place] [id number]    Call in 2 days  For wound re-check      Oscar Ross 167, APRN - CNP  05/25/20 0945

## 2020-05-26 ENCOUNTER — TELEPHONE (OUTPATIENT)
Dept: FAMILY MEDICINE CLINIC | Age: 35
End: 2020-05-26

## 2020-06-11 ENCOUNTER — HOSPITAL ENCOUNTER (EMERGENCY)
Age: 35
Discharge: HOME | End: 2020-06-11
Payer: MEDICARE

## 2020-06-11 VITALS
WEIGHT: 190 LBS | SYSTOLIC BLOOD PRESSURE: 138 MMHG | RESPIRATION RATE: 20 BRPM | TEMPERATURE: 97.8 F | HEIGHT: 73 IN | HEART RATE: 81 BPM | DIASTOLIC BLOOD PRESSURE: 72 MMHG | OXYGEN SATURATION: 97 % | BODY MASS INDEX: 25.18 KG/M2

## 2020-06-11 LAB
ACETAMINOPHEN LEVEL: < 5 UG/ML (ref 0–20)
ALBUMIN SERPL-MCNC: 4.4 G/DL (ref 3.5–5.1)
ALP BLD-CCNC: 92 U/L (ref 38–126)
ALT SERPL-CCNC: 28 U/L (ref 11–66)
AMPHETAMINE+METHAMPHETAMINE URINE SCREEN: NEGATIVE
ANION GAP SERPL CALCULATED.3IONS-SCNC: 15 MEQ/L (ref 8–16)
AST SERPL-CCNC: 24 U/L (ref 5–40)
BACTERIA: ABNORMAL /HPF
BARBITURATE QUANTITATIVE URINE: NEGATIVE
BASOPHILS # BLD: 0.6 %
BASOPHILS ABSOLUTE: 0.1 THOU/MM3 (ref 0–0.1)
BENZODIAZEPINE QUANTITATIVE URINE: NEGATIVE
BILIRUB SERPL-MCNC: 0.3 MG/DL (ref 0.3–1.2)
BILIRUBIN DIRECT: < 0.2 MG/DL (ref 0–0.3)
BILIRUBIN URINE: NEGATIVE
BLOOD, URINE: NEGATIVE
BUN BLDV-MCNC: 10 MG/DL (ref 7–22)
CALCIUM SERPL-MCNC: 9.3 MG/DL (ref 8.5–10.5)
CANNABINOID QUANTITATIVE URINE: POSITIVE
CASTS 2: ABNORMAL /LPF
CASTS UA: ABNORMAL /LPF
CHARACTER, URINE: CLEAR
CHLORIDE BLD-SCNC: 104 MEQ/L (ref 98–111)
CO2: 24 MEQ/L (ref 23–33)
COCAINE METABOLITE QUANTITATIVE URINE: NEGATIVE
COLOR: YELLOW
CREAT SERPL-MCNC: 0.9 MG/DL (ref 0.4–1.2)
CRYSTALS, UA: ABNORMAL
EOSINOPHIL # BLD: 1.3 %
EOSINOPHILS ABSOLUTE: 0.1 THOU/MM3 (ref 0–0.4)
EPITHELIAL CELLS, UA: ABNORMAL /HPF
ERYTHROCYTE [DISTWIDTH] IN BLOOD BY AUTOMATED COUNT: 12.3 % (ref 11.5–14.5)
ERYTHROCYTE [DISTWIDTH] IN BLOOD BY AUTOMATED COUNT: 39.3 FL (ref 35–45)
ETHYL ALCOHOL, SERUM: 0.07 %
ETHYL ALCOHOL, SERUM: 0.23 %
GFR SERPL CREATININE-BSD FRML MDRD: > 90 ML/MIN/1.73M2
GLUCOSE BLD-MCNC: 111 MG/DL (ref 70–108)
GLUCOSE URINE: NEGATIVE MG/DL
HCT VFR BLD CALC: 48.2 % (ref 42–52)
HEMOGLOBIN: 16.7 GM/DL (ref 14–18)
IMMATURE GRANS (ABS): 0.02 THOU/MM3 (ref 0–0.07)
IMMATURE GRANULOCYTES: 0.2 %
KETONES, URINE: NEGATIVE
LEUKOCYTE ESTERASE, URINE: ABNORMAL
LYMPHOCYTES # BLD: 47.5 %
LYMPHOCYTES ABSOLUTE: 4.3 THOU/MM3 (ref 1–4.8)
MCH RBC QN AUTO: 30.3 PG (ref 26–33)
MCHC RBC AUTO-ENTMCNC: 34.6 GM/DL (ref 32.2–35.5)
MCV RBC AUTO: 87.5 FL (ref 80–94)
MISCELLANEOUS 2: ABNORMAL
MONOCYTES # BLD: 7.2 %
MONOCYTES ABSOLUTE: 0.6 THOU/MM3 (ref 0.4–1.3)
NITRITE, URINE: NEGATIVE
NUCLEATED RED BLOOD CELLS: 0 /100 WBC
OPIATES, URINE: NEGATIVE
OSMOLALITY CALCULATION: 284.7 MOSMOL/KG (ref 275–300)
OXYCODONE: NEGATIVE
PH UA: 5.5 (ref 5–9)
PHENCYCLIDINE QUANTITATIVE URINE: NEGATIVE
PLATELET # BLD: 297 THOU/MM3 (ref 130–400)
PMV BLD AUTO: 10 FL (ref 9.4–12.4)
POTASSIUM SERPL-SCNC: 3.6 MEQ/L (ref 3.5–5.2)
PROTEIN UA: NEGATIVE
RBC # BLD: 5.51 MILL/MM3 (ref 4.7–6.1)
RBC URINE: ABNORMAL /HPF
RENAL EPITHELIAL, UA: ABNORMAL
SALICYLATE, SERUM: < 0.3 MG/DL (ref 2–10)
SEG NEUTROPHILS: 43.2 %
SEGMENTED NEUTROPHILS ABSOLUTE COUNT: 3.9 THOU/MM3 (ref 1.8–7.7)
SODIUM BLD-SCNC: 143 MEQ/L (ref 135–145)
SPECIFIC GRAVITY, URINE: 1.01 (ref 1–1.03)
TOTAL PROTEIN: 7.7 G/DL (ref 6.1–8)
TSH SERPL DL<=0.05 MIU/L-ACNC: 0.93 UIU/ML (ref 0.4–4.2)
UROBILINOGEN, URINE: 1 EU/DL (ref 0–1)
WBC # BLD: 9 THOU/MM3 (ref 4.8–10.8)
WBC UA: ABNORMAL /HPF
YEAST: ABNORMAL

## 2020-06-11 PROCEDURE — 80053 COMPREHEN METABOLIC PANEL: CPT

## 2020-06-11 PROCEDURE — G0480 DRUG TEST DEF 1-7 CLASSES: HCPCS

## 2020-06-11 PROCEDURE — 80307 DRUG TEST PRSMV CHEM ANLYZR: CPT

## 2020-06-11 PROCEDURE — 85025 COMPLETE CBC W/AUTO DIFF WBC: CPT

## 2020-06-11 PROCEDURE — 84443 ASSAY THYROID STIM HORMONE: CPT

## 2020-06-11 PROCEDURE — 36415 COLL VENOUS BLD VENIPUNCTURE: CPT

## 2020-06-11 PROCEDURE — 82248 BILIRUBIN DIRECT: CPT

## 2020-06-11 PROCEDURE — 81001 URINALYSIS AUTO W/SCOPE: CPT

## 2020-06-11 PROCEDURE — 99285 EMERGENCY DEPT VISIT HI MDM: CPT

## 2020-06-11 ASSESSMENT — PATIENT HEALTH QUESTIONNAIRE - PHQ9: SUM OF ALL RESPONSES TO PHQ QUESTIONS 1-9: 18

## 2020-06-11 ASSESSMENT — SLEEP AND FATIGUE QUESTIONNAIRES
DIFFICULTY ARISING: YES
RESTFUL SLEEP: NO
DIFFICULTY STAYING ASLEEP: YES
DO YOU USE A SLEEP AID: NO
SLEEP PATTERN: NIGHTMARES/TERRORS
AVERAGE NUMBER OF SLEEP HOURS: 1
DIFFICULTY FALLING ASLEEP: YES
DO YOU HAVE DIFFICULTY SLEEPING: YES

## 2020-06-11 NOTE — ED NOTES
Pt. Resting in bed with even and unlabored respirations. Pt. Resting with lights off and eyes closed. Pt. Has no further concerns, questions or needs at this time. Call light within reach.         Tonja Stephenson RN  06/11/20 0937

## 2020-06-11 NOTE — ED NOTES
ED nurse-to-nurse bedside report    Chief Complaint   Patient presents with    Suicidal      LOC: alert and orientated to name, place, date  Vital signs   Vitals:    06/11/20 0112 06/11/20 0137 06/11/20 0541   BP: (!) 147/80  138/72   Pulse: 98  81   Resp: 18  20   Temp:  97.8 °F (36.6 °C)    TempSrc:  Oral    SpO2: 98%  97%   Weight: 190 lb (86.2 kg)     Height: 6' 1\" (1.854 m)        Pain:    Pain Interventions: none   Pain Goal: none   Oxygen: No    Current needs required room air    Telemetry: No  LDAs:    Continuous Infusions:   Mobility: Independent  [place] Fall Risk Score: No flowsheet data found.   Fall Interventions: low risk   Report given to: catina Cotton RN  06/11/20 4332

## 2020-06-11 NOTE — PROGRESS NOTES
BAL Re-Assess:   Status & Exam & Behavior Support Service Tabs      Present Suicidal Behavior:      Verbal:  Denies     Plan: Denies     Current Suicide Risk: Low, Moderate or High: Low     Present Homicidal Behavior:    Verbal: Denies     Plan: Denies      Psychosis:    Hallucinations: Denies     Delusions: Denies       Clinical Re-Assessment Summary including Current Mental State of Patient:     5841  Pt denies current suicidal/homicidal thought, hallucinations denied, no delusions noted. Pt reportedly called his mother last night while intoxicated and informed her that he was feeling down. Pts mother called 44 to complete a wellness check on pt. Pt state \"I really don't have suicidal thoughts, I just get down sometimes\". Pt and his girlfriend are having issues in their relationship therefore pt temporarily moved in with a friend. Pt reportedly feels better now that he is sober and is interested in outpatient AOD/MH Treatment. Updated Level of Care Disposition:      Pts BAL at 0124 was . 23    Pts BAL at 0919 was . 07    [phone] with Dr. Yancey Babinski who recommend pt follow up with outpatient mental health treatment. [id number]  ED Provider, Mike Best PA-C updated. 0945  Pt updated, given outpatient AOD/MH treatment information for [facility] and other mental health agencies. Pt to be discharged.

## 2020-06-11 NOTE — ED NOTES
Patient resting in bed, eyes closed with easy and unlabored respirations. Continuous supervision by spencer.       Tyson Monday, JESSICA  06/11/20 2459 20.8

## 2020-06-11 NOTE — ED NOTES
Pt arrived to the ED For suicidal thoughts with intention of acting on it. Pt states he \"kindof\" has a plan however will not specify. Pt states he has been feeling this way was about 3 weeks. Pt has hx of depression and anxiety. Pt has been drinking this evening. Pt respirations even and unlabored. Pt vitals are stable. Pt denies pain.  Pt calm and cooperative      Maylene Litten, RN  06/11/20 1868

## 2020-06-11 NOTE — ED NOTES
Bed: 026A  Expected date:   Expected time:   Means of arrival:   Comments:  CHETAN Meléndez RN  06/11/20 0111

## 2020-06-13 NOTE — ED PROVIDER NOTES
to display        I have given the patient strict written and verbal instructions about care at home,follow-up, and signs and symptoms of worsening of condition and they did verbalize understanding. Patient was seen independently by myself. The Patient's final impression and disposition and plan was determined by myself. CRITICAL CARE:   none    CONSULTS:  None    PROCEDURES:  None    FINAL IMPRESSION      1. Acute alcoholic intoxication without complication ([facility] [address]    2.  Depression, unspecified depression type          DISPOSITION/PLAN   DISPOSITION Decision To Discharge 06/11/2020 09:59:17 AM        PATIENT REFERRED TO:  ELAINA Shankar CNP  [facility] [address]  [facility]  [phone]    In 2 days  and resources given by behavioral access team      DISCHARGE MEDICATIONS:  Discharge Medication List as of 6/11/2020 10:00 AM          (Please note that portions of this note were completed with a voice recognition program.  Efforts were made to edit thedictations but occasionally words are mis-transcribed.)    ELAINA Esposito CNP, APRN - CNP  06/13/20 1928

## 2020-06-15 ENCOUNTER — TELEPHONE (OUTPATIENT)
Dept: FAMILY MEDICINE CLINIC | Age: 35
End: 2020-06-15

## 2021-09-11 ENCOUNTER — HOSPITAL ENCOUNTER (EMERGENCY)
Age: 36
Discharge: HOME | End: 2021-09-11
Payer: MEDICARE

## 2021-09-11 VITALS
OXYGEN SATURATION: 98 % | TEMPERATURE: 98.3 F | RESPIRATION RATE: 16 BRPM | SYSTOLIC BLOOD PRESSURE: 132 MMHG | HEART RATE: 78 BPM | DIASTOLIC BLOOD PRESSURE: 78 MMHG

## 2021-09-11 DIAGNOSIS — R10.84 GENERALIZED ABDOMINAL PAIN: Primary | ICD-10-CM

## 2021-09-11 PROCEDURE — 99213 OFFICE O/P EST LOW 20 MIN: CPT

## 2021-09-11 PROCEDURE — 99213 OFFICE O/P EST LOW 20 MIN: CPT | Performed by: NURSE PRACTITIONER

## 2021-09-11 ASSESSMENT — ENCOUNTER SYMPTOMS
ABDOMINAL PAIN: 1
EYE REDNESS: 0
EYE DISCHARGE: 0
TROUBLE SWALLOWING: 0
SORE THROAT: 0
ABDOMINAL DISTENTION: 0
RHINORRHEA: 0
NAUSEA: 0
SHORTNESS OF BREATH: 0
BLOOD IN STOOL: 1
VOMITING: 0
DIARRHEA: 1
COUGH: 0

## 2021-09-11 NOTE — ED TRIAGE NOTES
Bhavesh Antonio arrives to room with complaint of wants work note symptoms started today. Bhavesh Antonio states he had stomach pain and missed work today symptoms are resolved.

## 2021-09-12 NOTE — ED PROVIDER NOTES
[address] [facility] Encounter      [address] [facility]       Chief Complaint   Patient presents with    Abdominal Pain     resolved    Letter for School/Work       Nurses Notes reviewed and I agree except as noted in the HPI. HISTORY OF PRESENT ILLNESS   Francisco Dahl is a 43 y.o. male who presents for evaluation/work note for chronic intermittent abdominal pain. Most recent onset less than 1 week ago, resolved. No vomiting, diarrhea. Medical history alcoholism. States current symptoms were not alcohol related. Patient complains of intermittent diarrhea with abdominal pain. He also noted blood in stool at times. No fatigue. No weight loss. He has never had a colonoscopy. No current treatment. REVIEW OF SYSTEMS     Review of Systems   Constitutional: Negative for appetite change, chills, diaphoresis, fatigue, fever and unexpected weight change. HENT: Negative for congestion, ear pain, rhinorrhea, sore throat and trouble swallowing. Eyes: Negative for discharge and redness. Respiratory: Negative for cough and shortness of breath. Cardiovascular: Negative for chest pain. Gastrointestinal: Positive for abdominal pain, blood in stool (not recent) and diarrhea. Negative for abdominal distention, nausea and vomiting. Genitourinary: Negative for decreased urine volume. Musculoskeletal: Negative for neck pain and neck stiffness. Skin: Negative for rash. Neurological: Negative for headaches. Hematological: Negative for adenopathy. Psychiatric/Behavioral: Negative for sleep disturbance. PAST MEDICAL HISTORY         Diagnosis Date    Anxiety     Depression        SURGICAL HISTORY     Patient  has a past surgical history that includes Hand surgery (Right, 2004). CURRENT MEDICATIONS     There are no discharge medications for this patient. ALLERGIES     Patient is has No Known Allergies.     FAMILY HISTORY     Patient'sfamily history includes Arthritis in his maternal grandmother; Depression in his father and mother; Diabetes in his maternal grandmother; Early Death in his father; Heart Disease in his father and mother; Substance Abuse in his father, maternal uncle, and paternal uncle. SOCIAL HISTORY     Patient  reports that he has been smoking. He has been smoking about 1.00 pack per day. His smokeless tobacco use includes chew. He reports current alcohol use. He reports current drug use. Drug: Marijuana. PHYSICAL EXAM     ED TRIAGE VITALS  BP: 132/78, Temp: 98.3 °F (36.8 °C), Pulse: 78, Resp: 16, SpO2: 98 %  Physical Exam  Vitals and nursing note reviewed. Constitutional:       General: He is not in acute distress. Appearance: Normal appearance. He is well-developed. He is not ill-appearing, toxic-appearing or diaphoretic. HENT:      Head: Normocephalic and atraumatic. Jaw: No trismus. Right Ear: Hearing, tympanic membrane, ear canal and external ear normal. No mastoid tenderness. No hemotympanum. Tympanic membrane is not perforated, erythematous or bulging. Left Ear: Hearing, tympanic membrane, ear canal and external ear normal. No mastoid tenderness. No hemotympanum. Tympanic membrane is not perforated, erythematous or bulging. Nose: Nose normal.      Mouth/Throat:      Mouth: Mucous membranes are moist.      Pharynx: Oropharynx is clear. Uvula midline. Tonsils: No tonsillar abscesses. Eyes:      General: No scleral icterus. Conjunctiva/sclera: Conjunctivae normal.   Neck:      Thyroid: No thyromegaly. Trachea: Trachea normal.   Cardiovascular:      Rate and Rhythm: Normal rate and regular rhythm. No extrasystoles are present. Chest Wall: PMI is not displaced. Heart sounds: Normal heart sounds. No murmur heard. No friction rub. No gallop. Pulmonary:      Effort: Pulmonary effort is normal. No accessory muscle usage or respiratory distress. Breath sounds: Normal breath sounds. ELAINA Pemberton - CNP  09/11/21 2008

## 2022-08-22 ENCOUNTER — HOSPITAL ENCOUNTER (EMERGENCY)
Age: 37
Discharge: HOME | End: 2022-08-22
Payer: MEDICARE

## 2022-08-22 VITALS
SYSTOLIC BLOOD PRESSURE: 125 MMHG | RESPIRATION RATE: 17 BRPM | HEART RATE: 82 BPM | TEMPERATURE: 97.6 F | OXYGEN SATURATION: 98 % | WEIGHT: 190 LBS | BODY MASS INDEX: 25.07 KG/M2 | DIASTOLIC BLOOD PRESSURE: 80 MMHG

## 2022-08-22 DIAGNOSIS — F10.920 ACUTE ALCOHOLIC INTOXICATION WITHOUT COMPLICATION (HCC): Primary | ICD-10-CM

## 2022-08-22 LAB
ACETAMINOPHEN LEVEL: < 5 UG/ML (ref 0–20)
ALBUMIN SERPL-MCNC: 5.3 G/DL (ref 3.5–5.1)
ALP BLD-CCNC: 88 U/L (ref 38–126)
ALT SERPL-CCNC: 36 U/L (ref 11–66)
AMPHETAMINE+METHAMPHETAMINE URINE SCREEN: NEGATIVE
ANION GAP SERPL CALCULATED.3IONS-SCNC: 15 MEQ/L (ref 8–16)
AST SERPL-CCNC: 23 U/L (ref 5–40)
BARBITURATE QUANTITATIVE URINE: NEGATIVE
BASOPHILS # BLD: 0.7 %
BASOPHILS ABSOLUTE: 0.1 THOU/MM3 (ref 0–0.1)
BENZODIAZEPINE QUANTITATIVE URINE: NEGATIVE
BILIRUB SERPL-MCNC: 0.4 MG/DL (ref 0.3–1.2)
BILIRUBIN DIRECT: < 0.2 MG/DL (ref 0–0.3)
BUN BLDV-MCNC: 5 MG/DL (ref 7–22)
CALCIUM SERPL-MCNC: 9.3 MG/DL (ref 8.5–10.5)
CANNABINOID QUANTITATIVE URINE: POSITIVE
CHLORIDE BLD-SCNC: 106 MEQ/L (ref 98–111)
CO2: 24 MEQ/L (ref 23–33)
COCAINE METABOLITE QUANTITATIVE URINE: POSITIVE
CREAT SERPL-MCNC: 0.9 MG/DL (ref 0.4–1.2)
EOSINOPHIL # BLD: 1.4 %
EOSINOPHILS ABSOLUTE: 0.1 THOU/MM3 (ref 0–0.4)
ERYTHROCYTE [DISTWIDTH] IN BLOOD BY AUTOMATED COUNT: 12.7 % (ref 11.5–14.5)
ERYTHROCYTE [DISTWIDTH] IN BLOOD BY AUTOMATED COUNT: 41.1 FL (ref 35–45)
ETHYL ALCOHOL, SERUM: 0.11 %
ETHYL ALCOHOL, SERUM: 0.28 %
GFR SERPL CREATININE-BSD FRML MDRD: > 90 ML/MIN/1.73M2
GLUCOSE BLD-MCNC: 116 MG/DL (ref 70–108)
HCT VFR BLD CALC: 54.4 % (ref 42–52)
HEMOGLOBIN: 18.9 GM/DL (ref 14–18)
IMMATURE GRANS (ABS): 0.02 THOU/MM3 (ref 0–0.07)
IMMATURE GRANULOCYTES: 0.2 %
LYMPHOCYTES # BLD: 49.3 %
LYMPHOCYTES ABSOLUTE: 4 THOU/MM3 (ref 1–4.8)
MCH RBC QN AUTO: 30.8 PG (ref 26–33)
MCHC RBC AUTO-ENTMCNC: 34.7 GM/DL (ref 32.2–35.5)
MCV RBC AUTO: 88.7 FL (ref 80–94)
MONOCYTES # BLD: 5.9 %
MONOCYTES ABSOLUTE: 0.5 THOU/MM3 (ref 0.4–1.3)
NUCLEATED RED BLOOD CELLS: 0 /100 WBC
OPIATES, URINE: NEGATIVE
OSMOLALITY CALCULATION: 286.9 MOSMOL/KG (ref 275–300)
OXYCODONE: NEGATIVE
PHENCYCLIDINE QUANTITATIVE URINE: NEGATIVE
PLATELET # BLD: 384 THOU/MM3 (ref 130–400)
PMV BLD AUTO: 9.8 FL (ref 9.4–12.4)
POTASSIUM REFLEX MAGNESIUM: 3.6 MEQ/L (ref 3.5–5.2)
RBC # BLD: 6.13 MILL/MM3 (ref 4.7–6.1)
SALICYLATE, SERUM: < 0.3 MG/DL (ref 2–10)
SEG NEUTROPHILS: 42.5 %
SEGMENTED NEUTROPHILS ABSOLUTE COUNT: 3.4 THOU/MM3 (ref 1.8–7.7)
SODIUM BLD-SCNC: 145 MEQ/L (ref 135–145)
TOTAL PROTEIN: 7.6 G/DL (ref 6.1–8)
TSH SERPL DL<=0.05 MIU/L-ACNC: 1.38 UIU/ML (ref 0.4–4.2)
WBC # BLD: 8.1 THOU/MM3 (ref 4.8–10.8)

## 2022-08-22 PROCEDURE — 99283 EMERGENCY DEPT VISIT LOW MDM: CPT

## 2022-08-22 PROCEDURE — 80076 HEPATIC FUNCTION PANEL: CPT

## 2022-08-22 PROCEDURE — 85025 COMPLETE CBC W/AUTO DIFF WBC: CPT

## 2022-08-22 PROCEDURE — 36415 COLL VENOUS BLD VENIPUNCTURE: CPT

## 2022-08-22 PROCEDURE — 80179 DRUG ASSAY SALICYLATE: CPT

## 2022-08-22 PROCEDURE — 80048 BASIC METABOLIC PNL TOTAL CA: CPT

## 2022-08-22 PROCEDURE — 82077 ASSAY SPEC XCP UR&BREATH IA: CPT

## 2022-08-22 PROCEDURE — 80143 DRUG ASSAY ACETAMINOPHEN: CPT

## 2022-08-22 PROCEDURE — 80307 DRUG TEST PRSMV CHEM ANLYZR: CPT

## 2022-08-22 PROCEDURE — 84443 ASSAY THYROID STIM HORMONE: CPT

## 2022-08-22 ASSESSMENT — SLEEP AND FATIGUE QUESTIONNAIRES
SLEEP PATTERN: DIFFICULTY ARISING
DO YOU HAVE DIFFICULTY SLEEPING: YES
DO YOU USE A SLEEP AID: NO

## 2022-08-22 ASSESSMENT — ENCOUNTER SYMPTOMS
EYE PAIN: 0
CONSTIPATION: 0
VOMITING: 0
COUGH: 0
NAUSEA: 0
SINUS PRESSURE: 0
WHEEZING: 0
ABDOMINAL PAIN: 0
BLOOD IN STOOL: 0
RHINORRHEA: 0
DIARRHEA: 0
SHORTNESS OF BREATH: 0

## 2022-08-22 ASSESSMENT — PAIN - FUNCTIONAL ASSESSMENT
PAIN_FUNCTIONAL_ASSESSMENT: NONE - DENIES PAIN

## 2022-08-22 ASSESSMENT — PATIENT HEALTH QUESTIONNAIRE - PHQ9: SUM OF ALL RESPONSES TO PHQ QUESTIONS 1-9: 17
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
## 2022-08-22 NOTE — ED NOTES
Safe meal tray delivered. Ice water and ginger ale given in safe cups. No signs of distress. VSS. Call light in reach. No concerns at this time.       Dianne Lai RN  08/22/22 9571

## 2022-08-22 NOTE — ED NOTES
Patient resting in cot with lights off for comfort. Patient remains in safe room 25 per protocols for 1:1 observation. No signs of distress.        Ten Lai RN  08/22/22 5460

## 2022-08-22 NOTE — DISCHARGE INSTRUCTIONS
Recommend plenty of oral hydration, rest. Follow up with PCP in the next 3 days as needed. Follow up to ED if worsening trends.

## 2022-08-22 NOTE — ED NOTES
Patient resting in cot. Patient remains in safe room 25 per protocols for 1:1 observation. No signs of distress.        Rubén Lai RN  08/22/22 9976

## 2022-08-22 NOTE — ED PROVIDER NOTES
[address] [facility] [address] [address] EMERGENCY DEPT  EMERGENCY MEDICINE     Pt Name: Alice Felipe  MRN: [id number]  [place] 1985  Date of evaluation: 8/22/2022  PCP:    No primary care provider on file. Provider: ELAINA Suresh CNP    CHIEF COMPLAINT       Chief Complaint   Patient presents with    Mental Health Problem           HISTORY OF PRESENT ILLNESS    Alice Felipe is a 42 y.o. male patient that presents to ER with concern for suicidal ideation. Patient was brought in by [employer], but is not under an [address] [place]. Patient is here voluntarily because he states that \"he does not want to be here anymore\". Patient does state that he drinks several small bottles of fireball this evening. Patient denies suicidal or homicidal ideation. He states he was just referring to needing help for his alcoholism. Patient denies physical symptoms including chest pain or shortness of breath. Triage notes and Nursing notes were reviewed by myself. Any discrepancies are addressed above.     PAST MEDICAL HISTORY     Past Medical History:   Diagnosis Date    Anxiety     Depression        SURGICAL HISTORY       Past Surgical History:   Procedure Laterality Date    HAND SURGERY Right 2004       CURRENT MEDICATIONS       Previous Medications    No medications on file       ALLERGIES       No Known Allergies    FAMILY HISTORY       Family History   Problem Relation Age of Onset    Depression Mother     Heart Disease Mother     Depression Father     Heart Disease Father     Early Death Father     Substance Abuse Father     Substance Abuse Maternal Uncle     Substance Abuse Paternal Uncle     Arthritis Maternal Grandmother     Diabetes Maternal Grandmother         SOCIAL HISTORY       Social History     Socioeconomic History    Marital status: Single     Spouse name: None    Number of children: None    Years of education: None    Highest education level: None   Tobacco Use    Smoking status: Every Day     Packs/day: 1.00     Types: Cigarettes Smokeless tobacco: Current     Types: Chew   Vaping Use    Vaping Use: Never used   Substance and Sexual Activity    Alcohol use: Yes     Comment: 2-3 times a wk. Drug use: Yes     Types: Marijuana Berneta Herrera)     Comment: 4-    Sexual activity: Yes     Partners: Female       REVIEW OF SYSTEMS     Review of Systems   Constitutional:  Negative for appetite change, chills, fatigue, fever and unexpected weight change. HENT:  Negative for ear pain, rhinorrhea and sinus pressure. Eyes:  Negative for pain and visual disturbance. Respiratory:  Negative for cough, shortness of breath and wheezing. Cardiovascular:  Negative for chest pain, palpitations and leg swelling. Gastrointestinal:  Negative for abdominal pain, blood in stool, constipation, diarrhea, nausea and vomiting. Genitourinary:  Negative for dysuria, frequency and hematuria. Musculoskeletal:  Negative for arthralgias and joint swelling. Skin:  Negative for rash. Neurological:  Negative for dizziness, syncope, weakness, light-headedness and headaches. Hematological:  Does not bruise/bleed easily. Psychiatric/Behavioral:  Positive for suicidal ideas. Except as noted above the remainder of the review of systems was reviewed and is negative. SCREENINGS                        PHYSICAL EXAM    (up to 7 for level 4, 8 or more for level 5)     ED Triage Vitals [02/19/22 1512]   BP Temp Temp Source Pulse Resp SpO2 Height Weight   (!) 134/95 98.2 °F (36.8 °C) Oral 124 16 100 % -- --       Physical Exam  Vitals and nursing note reviewed. Constitutional:       General: He is not in acute distress. Appearance: He is well-developed. He is not diaphoretic. HENT:      Head: Normocephalic and atraumatic. Right Ear: External ear normal.      Left Ear: External ear normal.   Eyes:      Conjunctiva/sclera: Conjunctivae normal.      Pupils: Pupils are equal, round, and reactive to light.    Cardiovascular:      Rate and Rhythm: Normal rate and regular rhythm. Heart sounds: Normal heart sounds. No murmur heard. No gallop. Pulmonary:      Effort: Pulmonary effort is normal. No respiratory distress. Breath sounds: Normal breath sounds. No wheezing or rales. Abdominal:      General: Bowel sounds are normal. There is no distension. Palpations: Abdomen is soft. Musculoskeletal:         General: Normal range of motion. Cervical back: Normal range of motion. Skin:     General: Skin is warm and dry. Neurological:      Mental Status: He is alert and oriented to person, place, and time. Psychiatric:         Mood and Affect: Mood normal. Affect is tearful. Thought Content: Thought content is not paranoid or delusional. Thought content does not include homicidal or suicidal ideation. Thought content does not include homicidal or suicidal plan. DIAGNOSTIC RESULTS     EKG:(none if blank)  All EKGs are interpreted by the Emergency Department Physician who either signs or Co-signs this chart in the absence of a cardiologist.        RADIOLOGY: (none if blank)   I directly visualized the following images and reviewed the radiologist interpretations.   Interpretation per the Radiologist below, if available at the time of this note:  No orders to display       LABS:  Labs Reviewed   SALICYLATE LEVEL - Abnormal; Notable for the following components:       Result Value    Salicylate, Serum < 0.3 (*)     All other components within normal limits   CBC WITH AUTO DIFFERENTIAL - Abnormal; Notable for the following components:    RBC 6.13 (*)     Hemoglobin 18.9 (*)     Hematocrit 54.4 (*)     All other components within normal limits   BASIC METABOLIC PANEL W/ REFLEX TO MG FOR LOW K - Abnormal; Notable for the following components:    Glucose 116 (*)     BUN 5 (*)     All other components within normal limits   HEPATIC FUNCTION PANEL - Abnormal; Notable for the following components:    Albumin 5.3 (*)     All other components within normal limits   ETHANOL   ACETAMINOPHEN LEVEL   TSH   URINE DRUG SCREEN   ANION GAP   OSMOLALITY   GLOMERULAR FILTRATION RATE, ESTIMATED   ETHANOL       All other labs were within normal range or not returned as of this dictation. Please note, any cultures that may have been sent were not resulted at the time of this patient visit. EMERGENCY DEPARTMENT COURSE and Medical Decision Making:     Vitals:    Vitals:    08/22/22 0453   BP: 125/80   Pulse: 92   Resp: 16   Temp: 97.6 °F (36.4 °C)   TempSrc: Oral   SpO2: 98%       PROCEDURES: (None if blank)  Procedures       MDM     Amount and/or Complexity of Data Reviewed  Clinical lab tests: ordered and reviewed    Risk of Complications, Morbidity, and/or Mortality  Presenting problems: moderate  Diagnostic procedures: moderate  Management options: moderate      Patient presents to ER with concern for suicidal ideation. Differential diagnosis includes but not limited to thyroid abnormality, acute alcohol intoxication, electrolyte abnormality, infection or polysubstance abuse. His blood alcohol returns at 0.28. Redraw was scheduled for 1300. Patient denies suicidal or homicidal ideation at this time. Patient handed off to KATYA Currie PA-C 6 AM.    ED Medications administered this visit:  Medications - No data to display      FINAL IMPRESSION      1. Acute alcoholic intoxication without complication ([facility] [address]          DISPOSITION/PLAN   DISPOSITION      Patient handed off to KATYA Currie PA-C 6 AM.    PATIENT REFERRED TO:  No follow-up provider specified.     DISCHARGE MEDICATIONS:  New Prescriptions    No medications on file              ELAINA Barbosa CNP (electronically signed)           ELAINA Barbosa CNP  08/22/22 7654

## 2022-08-22 NOTE — ED PROVIDER NOTES
ADDENDUM:  Care of this patient was assumed from Allison Beard NP. The patient was seen for Mental Health Problem  . The patient's initial evaluation and plan have been discussed with the prior provider who initially evaluated the patient. Nursing Notes, Past Medical Hx, Past Surgical Hx, Social Hx, Allergies, and Family Hx were all reviewed. Blood pressure 125/80, pulse 82, temperature 97.6 °F (36.4 °C), temperature source Oral, resp. rate 17, weight 190 lb (86.2 kg), SpO2 98 %. RESULTS:  Labs Reviewed   SALICYLATE LEVEL - Abnormal; Notable for the following components:       Result Value    Salicylate, Serum < 0.3 (*)     All other components within normal limits   CBC WITH AUTO DIFFERENTIAL - Abnormal; Notable for the following components:    RBC 6.13 (*)     Hemoglobin 18.9 (*)     Hematocrit 54.4 (*)     All other components within normal limits   BASIC METABOLIC PANEL W/ REFLEX TO MG FOR LOW K - Abnormal; Notable for the following components:    Glucose 116 (*)     BUN 5 (*)     All other components within normal limits   HEPATIC FUNCTION PANEL - Abnormal; Notable for the following components:    Albumin 5.3 (*)     All other components within normal limits   ETHANOL   ACETAMINOPHEN LEVEL   TSH   URINE DRUG SCREEN   ANION GAP   OSMOLALITY   GLOMERULAR FILTRATION RATE, ESTIMATED   ETHANOL     Unresulted tests above are all within normal limits. MEDICAL DECISION MAKING:  This patient is a 42 y.o. Male who came to the ED for alcohol intoxication and possible suicidal ideation. Transfer care was given to myself from Allison Beard NP while awaiting ethanol redraw. Patient initially 0.28. Redraw was done at 1 PM with labs of 0.11 ethanol.   Patient was reevaluated at this point was a good communicator and good historian and states that he was just feeling down initially in which he made a comment to his friend over the phone in which his friend called the police to check the patient out to make sure he was not suicidal.  Patient states since coming to the ED he is no suicidal thoughts or plan. No homicidal thoughts or plan. Discussed case with [facility] who states patient may be discharged home with outpatient follow-up. Patient ambulated without issues and was able to eat and drink adequately. FINAL IMPRESSION:  1.   Acute alcohol intoxication       Jitendra Mcgee PA-C  08/22/22 5348

## 2022-08-22 NOTE — ED NOTES
Patient resting in cot with lights off for comfort. Patient remains in safe room 25 per protocols for 1:1 observation. No signs of distress.        Tre Lai RN  08/22/22 5312

## 2022-08-22 NOTE — ED NOTES
Patient resting in cot with lights off for comfort. Patient remains in safe room 25 per protocols for 1:1 observation. No signs of distress.        Sylvia Lai RN  08/22/22 1276

## 2022-10-30 ENCOUNTER — HOSPITAL ENCOUNTER (EMERGENCY)
Age: 37
Discharge: HOME | End: 2022-10-30
Attending: STUDENT IN AN ORGANIZED HEALTH CARE EDUCATION/TRAINING PROGRAM
Payer: MEDICARE

## 2022-10-30 VITALS
SYSTOLIC BLOOD PRESSURE: 116 MMHG | OXYGEN SATURATION: 100 % | TEMPERATURE: 97.6 F | WEIGHT: 180 LBS | HEART RATE: 88 BPM | RESPIRATION RATE: 13 BRPM | DIASTOLIC BLOOD PRESSURE: 83 MMHG | HEIGHT: 73 IN | BODY MASS INDEX: 23.86 KG/M2

## 2022-10-30 DIAGNOSIS — F10.920 ACUTE ALCOHOLIC INTOXICATION WITHOUT COMPLICATION (HCC): Primary | ICD-10-CM

## 2022-10-30 LAB
ACETAMINOPHEN LEVEL: < 5 UG/ML (ref 0–20)
ALBUMIN SERPL-MCNC: 4.7 G/DL (ref 3.5–5.1)
ALP BLD-CCNC: 121 U/L (ref 38–126)
ALT SERPL-CCNC: 31 U/L (ref 11–66)
ANION GAP SERPL CALCULATED.3IONS-SCNC: 18 MEQ/L (ref 8–16)
AST SERPL-CCNC: 35 U/L (ref 5–40)
BASOPHILS # BLD: 0.6 %
BASOPHILS ABSOLUTE: 0.1 THOU/MM3 (ref 0–0.1)
BILIRUB SERPL-MCNC: 0.2 MG/DL (ref 0.3–1.2)
BILIRUBIN DIRECT: < 0.2 MG/DL (ref 0–0.3)
BUN BLDV-MCNC: 7 MG/DL (ref 7–22)
CALCIUM SERPL-MCNC: 9.2 MG/DL (ref 8.5–10.5)
CHLORIDE BLD-SCNC: 108 MEQ/L (ref 98–111)
CO2: 22 MEQ/L (ref 23–33)
CREAT SERPL-MCNC: 0.9 MG/DL (ref 0.4–1.2)
EKG ATRIAL RATE: 91 BPM
EKG P AXIS: 70 DEGREES
EKG P-R INTERVAL: 154 MS
EKG Q-T INTERVAL: 332 MS
EKG QRS DURATION: 84 MS
EKG QTC CALCULATION (BAZETT): 408 MS
EKG R AXIS: 86 DEGREES
EKG T AXIS: 63 DEGREES
EKG VENTRICULAR RATE: 91 BPM
EOSINOPHIL # BLD: 1.2 %
EOSINOPHILS ABSOLUTE: 0.1 THOU/MM3 (ref 0–0.4)
ERYTHROCYTE [DISTWIDTH] IN BLOOD BY AUTOMATED COUNT: 13.2 % (ref 11.5–14.5)
ERYTHROCYTE [DISTWIDTH] IN BLOOD BY AUTOMATED COUNT: 42.6 FL (ref 35–45)
ETHYL ALCOHOL, SERUM: 0.07 %
ETHYL ALCOHOL, SERUM: 0.14 %
ETHYL ALCOHOL, SERUM: 0.29 %
GFR SERPL CREATININE-BSD FRML MDRD: > 60 ML/MIN/1.73M2
GLUCOSE BLD-MCNC: 101 MG/DL (ref 70–108)
HCT VFR BLD CALC: 55.1 % (ref 42–52)
HEMOGLOBIN: 18.7 GM/DL (ref 14–18)
IMMATURE GRANS (ABS): 0.02 THOU/MM3 (ref 0–0.07)
IMMATURE GRANULOCYTES: 0.2 %
LYMPHOCYTES # BLD: 44.1 %
LYMPHOCYTES ABSOLUTE: 4.1 THOU/MM3 (ref 1–4.8)
MCH RBC QN AUTO: 30.7 PG (ref 26–33)
MCHC RBC AUTO-ENTMCNC: 33.9 GM/DL (ref 32.2–35.5)
MCV RBC AUTO: 90.5 FL (ref 80–94)
MONOCYTES # BLD: 6 %
MONOCYTES ABSOLUTE: 0.6 THOU/MM3 (ref 0.4–1.3)
NUCLEATED RED BLOOD CELLS: 0 /100 WBC
OSMOLALITY CALCULATION: 292.4 MOSMOL/KG (ref 275–300)
PLATELET # BLD: 326 THOU/MM3 (ref 130–400)
PMV BLD AUTO: 10.1 FL (ref 9.4–12.4)
POTASSIUM REFLEX MAGNESIUM: 4.3 MEQ/L (ref 3.5–5.2)
RBC # BLD: 6.09 MILL/MM3 (ref 4.7–6.1)
SALICYLATE, SERUM: < 0.3 MG/DL (ref 2–10)
SEG NEUTROPHILS: 47.9 %
SEGMENTED NEUTROPHILS ABSOLUTE COUNT: 4.5 THOU/MM3 (ref 1.8–7.7)
SODIUM BLD-SCNC: 148 MEQ/L (ref 135–145)
TOTAL PROTEIN: 7.7 G/DL (ref 6.1–8)
WBC # BLD: 9.4 THOU/MM3 (ref 4.8–10.8)

## 2022-10-30 PROCEDURE — 93010 ELECTROCARDIOGRAM REPORT: CPT | Performed by: INTERNAL MEDICINE

## 2022-10-30 PROCEDURE — 80048 BASIC METABOLIC PNL TOTAL CA: CPT

## 2022-10-30 PROCEDURE — 99285 EMERGENCY DEPT VISIT HI MDM: CPT

## 2022-10-30 PROCEDURE — 36415 COLL VENOUS BLD VENIPUNCTURE: CPT

## 2022-10-30 PROCEDURE — 80076 HEPATIC FUNCTION PANEL: CPT

## 2022-10-30 PROCEDURE — 80179 DRUG ASSAY SALICYLATE: CPT

## 2022-10-30 PROCEDURE — 93005 ELECTROCARDIOGRAM TRACING: CPT | Performed by: NURSE PRACTITIONER

## 2022-10-30 PROCEDURE — 2580000003 HC RX 258: Performed by: EMERGENCY MEDICINE

## 2022-10-30 PROCEDURE — 80143 DRUG ASSAY ACETAMINOPHEN: CPT

## 2022-10-30 PROCEDURE — 85025 COMPLETE CBC W/AUTO DIFF WBC: CPT

## 2022-10-30 PROCEDURE — 82077 ASSAY SPEC XCP UR&BREATH IA: CPT

## 2022-10-30 RX ORDER — 0.9 % SODIUM CHLORIDE 0.9 %
500 INTRAVENOUS SOLUTION INTRAVENOUS ONCE
Status: DISCONTINUED | OUTPATIENT
Start: 2022-10-30 | End: 2022-10-30 | Stop reason: HOSPADM

## 2022-10-30 RX ORDER — SODIUM CHLORIDE, SODIUM LACTATE, POTASSIUM CHLORIDE, AND CALCIUM CHLORIDE .6; .31; .03; .02 G/100ML; G/100ML; G/100ML; G/100ML
1000 INJECTION, SOLUTION INTRAVENOUS ONCE
Status: COMPLETED | OUTPATIENT
Start: 2022-10-30 | End: 2022-10-30

## 2022-10-30 RX ADMIN — SODIUM CHLORIDE, POTASSIUM CHLORIDE, SODIUM LACTATE AND CALCIUM CHLORIDE 1000 ML: 600; 310; 30; 20 INJECTION, SOLUTION INTRAVENOUS at 07:23

## 2022-10-30 ASSESSMENT — ENCOUNTER SYMPTOMS
RHINORRHEA: 0
SHORTNESS OF BREATH: 0
COUGH: 0
EYE PAIN: 0
WHEEZING: 0
ABDOMINAL DISTENTION: 0
NAUSEA: 0
EYE DISCHARGE: 0
DIARRHEA: 0
ABDOMINAL PAIN: 0
SORE THROAT: 0
VOMITING: 0

## 2022-10-30 ASSESSMENT — PATIENT HEALTH QUESTIONNAIRE - PHQ9: SUM OF ALL RESPONSES TO PHQ QUESTIONS 1-9: 2

## 2022-10-30 ASSESSMENT — SLEEP AND FATIGUE QUESTIONNAIRES
AVERAGE NUMBER OF SLEEP HOURS: 8
DO YOU USE A SLEEP AID: NO
DO YOU HAVE DIFFICULTY SLEEPING: NO

## 2022-10-30 ASSESSMENT — PAIN - FUNCTIONAL ASSESSMENT: PAIN_FUNCTIONAL_ASSESSMENT: NONE - DENIES PAIN

## 2022-10-30 ASSESSMENT — LIFESTYLE VARIABLES
HOW OFTEN DO YOU HAVE A DRINK CONTAINING ALCOHOL: NEVER
HOW MANY STANDARD DRINKS CONTAINING ALCOHOL DO YOU HAVE ON A TYPICAL DAY: PATIENT DOES NOT DRINK

## 2022-10-30 NOTE — ED NOTES
Patient lying in bed with blankets watching tv at this time. Patient respirations are regular and unlabored. Patient appears to be in no current distress. Patient VSS. Call light is within reach. Patient expresses no needs at this time.        Madeleine Jacques RN  10/30/22 6371

## 2022-10-30 NOTE — ED PROVIDER NOTES
Transfer of Care Note:   Physician Signing out: Amadeo Swift MD  Receiving Physician: Julissa Araiza MD  Sign out time: 9 AM      Brief history:  Patient is 58-year-old male presented to emergency department intoxicated with alcohol with stated that he did not want to exist anymore. Due to this patient was [facility]. Patient has no acute distress with no current complaints at this time. Items pending that need to be checked:  Blood alcohol level. Patient must be less than 0.08 ethanol level in order to be evaluated by psychiatry for previously stated suicidal ideation. Tentative Impression of patient:  Patient is stable clinically sober in no acute distress. He is alert and oriented x3. Patient states he does not have any intent or thoughts to hurt himself or anyone else. He states that his son's friend recently committed suicide which really bothered him and that he track a lot of alcohol yesterday and wanted to come here afterwards. Expected disposition of patient:  Pending results, discharged. Additional Assessment and results:   I have personally performed a face to face diagnostic evaluation on this patient. The patient's initial evaluation and plan have been discussed with the prior physician who initially evaluated the patient. Nursing Notes, Past Medical Hx, Past Surgical Hx, Social Hx, Allergies, vital signs and Family Hx were all reviewed. Vitals:    10/30/22 1546   BP: 116/83   Pulse: 88   Resp: 13   Temp:    SpO2: 100%     Physical Exam  Vitals and nursing note reviewed. Constitutional:       General: He is not in acute distress. Appearance: He is normal weight. He is not ill-appearing, toxic-appearing or diaphoretic. HENT:      Head: Normocephalic and atraumatic. Right Ear: External ear normal.      Left Ear: External ear normal.      Nose: Nose normal. No congestion or rhinorrhea.       Mouth/Throat:      Mouth: Mucous membranes are moist.      Pharynx: Oropharynx is clear. Eyes:      General: No scleral icterus. Conjunctiva/sclera: Conjunctivae normal.   Cardiovascular:      Rate and Rhythm: Normal rate and regular rhythm. Pulses: Normal pulses. Heart sounds: Normal heart sounds. No murmur heard. Pulmonary:      Effort: Pulmonary effort is normal. No respiratory distress. Breath sounds: Normal breath sounds. No wheezing. Abdominal:      General: Abdomen is flat. There is no distension. Palpations: Abdomen is soft. Tenderness: There is no abdominal tenderness. Musculoskeletal:         General: Normal range of motion. Cervical back: Normal range of motion and neck supple. No rigidity. Right lower leg: No edema. Left lower leg: No edema. Lymphadenopathy:      Cervical: No cervical adenopathy. Skin:     General: Skin is warm and dry. Capillary Refill: Capillary refill takes less than 2 seconds. Coloration: Skin is not jaundiced. Neurological:      General: No focal deficit present. Mental Status: He is alert and oriented to person, place, and time. Psychiatric:         Mood and Affect: Mood normal.         Behavior: Behavior normal.      Comments: Patient denied any suicidal thoughts or ideation or plan. Patient denied any homicidal thoughts, ideation or plan. Labs Reviewed   CBC WITH AUTO DIFFERENTIAL - Abnormal; Notable for the following components:       Result Value    Hemoglobin 18.7 (*)     Hematocrit 55.1 (*)     All other components within normal limits   BASIC METABOLIC PANEL W/ REFLEX TO MG FOR LOW K - Abnormal; Notable for the following components:    Sodium 148 (*)     CO2 22 (*)     All other components within normal limits   HEPATIC FUNCTION PANEL - Abnormal; Notable for the following components:     Total Bilirubin 0.2 (*)     All other components within normal limits   SALICYLATE LEVEL - Abnormal; Notable for the following components:    Salicylate, Serum < 0.3 (*)     All other components within normal limits   ANION GAP - Abnormal; Notable for the following components:    Anion Gap 18.0 (*)     All other components within normal limits   ETHANOL   ACETAMINOPHEN LEVEL   GLOMERULAR FILTRATION RATE, ESTIMATED   OSMOLALITY   ETHANOL   ETHANOL         Medications   0.9 % sodium chloride bolus (has no administration in time range)   lactated ringers bolus (1,000 mLs IntraVENous New Bag 10/30/22 0723)         No orders to display         ED Course as of 10/30/22 1627   Sun Oct 30, 2022   0942 Patient awaiting GIANFRANCO reassessment once sober. [DD]   0091 Patient assessed by me in no acute distress. Patient states that his son's friend committed suicide recently. So this really bothered him and he drank a lot of alcohol yesterday. He denied any suicidal thoughts or ideation stated that he wanted to come here when he was very intoxicated. He states he has no thoughts or plans to hurt himself or hurt anybody else at this time. Explained to patient that we need to wait until he is legally sober with a blood ethanol level less than 0.08 in order to officially evaluate him by psychiatry and clear him. Patient states that he would like to go home once sober. [WF]      ED Course User Index  [DD] Dara Vance DO  [WF] Susy Lake MD         Further MDM and disposition:   Assessment:   Patient in no acute distress clinically sober however his blood alcohol level was 0.14 at approximately 2 PM  Plan:   Repeat ethanol level ordered 3 hours past last draw. Pending those results patient may be evaluated by [facility] and if cleared by psychiatry, [facility] may be broken and patient can be discharged home. Patient signed out to resident Deepthi Pal MD PGY1 at 5 PM please see his note for final disposition of patient.     Final diagnoses:   Acute alcoholic intoxication without complication ([facility] [address]     New Prescriptions    No medications on file         Condition: condition: stable  Dispo: Patient's care transferred to resident Joni Hermelindo PGY1 please see his note for final disposition of patient. This transcription was electronically signed. It was dictated by use of voice recognition software and electronically transcribed. The transcription may contain errors not detected in proofreading.        Luis Carlos Falk MD  Resident  10/30/22 7271

## 2022-10-30 NOTE — ED NOTES
Patient remains in bed at this time and appears to be asleep. No distress noted. Respirations are regular. Will continue to monitor.      Alexandrea Lo RN  10/30/22 0569

## 2022-10-30 NOTE — ED NOTES
Patient bed with head under sheets, Patient moving in bed and looking out when a new patient walks in. Patient denies needs. Patient respirations are regular and unlabored. Patient appears to be in no current distress.         Trisha Samayoa RN  10/30/22 9333

## 2022-10-30 NOTE — ED NOTES
Patient resting in bed. Respirations easy and unlabored. No distress noted. Call light within reach. Pt being monitored continuously for pt safety.         Mary Friedman RN  10/30/22 [id number]

## 2022-10-30 NOTE — ED NOTES
ED nurse-to-nurse bedside report    Chief Complaint   Patient presents with    Suicidal     Thoughts and ideations      LOC: alert and orientated to name, place, date  Vital signs   Vitals:    10/30/22 0512   BP: 133/79   Pulse: 84   Resp: 16   Temp: 97.6 °F (36.4 °C)   TempSrc: Oral   SpO2: 98%   Weight: 180 lb (81.6 kg)   Height: 6' 1\" (1.854 m)      Pain:    Pain Interventions: see MAR  Pain Goal: 0/10  Oxygen: No    Current needs required none   Telemetry: No  LDAs:   Peripheral IV 10/30/22 Right Wrist (Active)     Continuous Infusions:   Mobility: Independent  Matias Fall Risk Score: No flowsheet data found. Fall Interventions: siderail, call light.    Report given to: [facility], RN  10/30/22 0336

## 2022-10-30 NOTE — ED TRIAGE NOTES
Pt presents to ED by [employer] for evaluation of suicidal thoughts and ideations. Pt states that his friend called police and said he was suicidal, pt states that he doesn't want to kill himself, he just doesn't want to exist. Pt under [address] at this time. Vitals, labs obtained.

## 2022-10-30 NOTE — ED PROVIDER NOTES
[place] ENCOUNTER          Pt Name: Kadeem Castillo  MRN: [id number]  [place] 1985  Date of evaluation: 10/30/2022  Treating Resident Physician: Rmaon Tovar MD  Supervising Physician: Loretta Downs MD    History obtained from the patient. CHIEF COMPLAINT       Chief Complaint   Patient presents with    Suicidal     Thoughts and ideations           HISTORY OF PRESENT ILLNESS    HPI  Kadeem Castillo is a 42 y.o. male who presents to the emergency department for evaluation of suicidal ideation. He is here placed under [facility] per police because he was toxic aided in complaint of suicidal ideation. Patient denied any homicidal ideation. Patient denies any plan for suicide. Has no previous history of suicide attempts  The patient has no other acute complaints at this time. REVIEW OF SYSTEMS   Review of Systems   Constitutional:  Negative for fatigue and fever. HENT:  Negative for congestion, ear pain, rhinorrhea and sore throat. Eyes:  Negative for pain and discharge. Respiratory:  Negative for cough, shortness of breath and wheezing. Cardiovascular:  Negative for chest pain, palpitations and leg swelling. Gastrointestinal:  Negative for abdominal distention, abdominal pain, diarrhea, nausea and vomiting. Genitourinary:  Negative for difficulty urinating, flank pain and frequency. Musculoskeletal:  Negative for arthralgias. Neurological:  Negative for dizziness, tremors, syncope, weakness and numbness. Psychiatric/Behavioral:  Positive for suicidal ideas.         PAST MEDICAL AND SURGICAL HISTORY     Past Medical History:   Diagnosis Date    Anxiety     Depression      Past Surgical History:   Procedure Laterality Date    HAND SURGERY Right 2004         MEDICATIONS     Current Facility-Administered Medications:     0.9 % sodium chloride bolus, 500 mL, IntraVENous, Once, Ramon Tovar MD  No current outpatient medications on file.      SOCIAL HISTORY     Social History     Social History Narrative    Not on file     Social History     Tobacco Use    Smoking status: Every Day     Packs/day: 1.00     Types: Cigarettes    Smokeless tobacco: Current     Types: Chew   Vaping Use    Vaping Use: Never used   Substance Use Topics    Alcohol use: Yes     Comment: 2-3 times a wk. Drug use: Yes     Types: Marijuana Seabron Barban)     Comment: 4-         ALLERGIES   No Known Allergies      FAMILY HISTORY     Family History   Problem Relation Age of Onset    Depression Mother     Heart Disease Mother     Depression Father     Heart Disease Father     Early Death Father     Substance Abuse Father     Substance Abuse Maternal Uncle     Substance Abuse Paternal Uncle     Arthritis Maternal Grandmother     Diabetes Maternal Grandmother          PREVIOUS RECORDS   Previous records reviewed:         PHYSICAL EXAM     ED Triage Vitals [10/30/22 0512]   BP Temp Temp Source Heart Rate Resp SpO2 Height Weight   133/79 97.6 °F (36.4 °C) Oral 84 16 98 % 6' 1\" (1.854 m) 180 lb (81.6 kg)     Initial vital signs and nursing assessment reviewed and normal. Body mass index is 23.75 kg/m². Pulsoximetry is normal per my interpretation. Additional Vital Signs:  Vitals:    10/30/22 0512   BP: 133/79   Pulse: 84   Resp: 16   Temp: 97.6 °F (36.4 °C)   SpO2: 98%       Physical Exam  Constitutional:       Appearance: Normal appearance. HENT:      Head: Normocephalic. Right Ear: External ear normal.      Left Ear: External ear normal.      Nose: Nose normal.      Mouth/Throat:      Mouth: Mucous membranes are moist.      Pharynx: Oropharynx is clear. Eyes:      Conjunctiva/sclera: Conjunctivae normal.      Pupils: Pupils are equal, round, and reactive to light. Cardiovascular:      Rate and Rhythm: Normal rate and regular rhythm. Pulses: Normal pulses. Heart sounds: Normal heart sounds.    Pulmonary:      Effort: Pulmonary effort is normal. Breath sounds: Normal breath sounds. Abdominal:      General: Bowel sounds are normal.      Palpations: Abdomen is soft. Musculoskeletal:         General: Normal range of motion. Cervical back: Normal range of motion and neck supple. Skin:     General: Skin is warm and dry. Capillary Refill: Capillary refill takes less than 2 seconds. Neurological:      General: No focal deficit present. Mental Status: He is alert. Psychiatric:      Comments: Patient appears intoxicated. Patient stating he wants to kill himself but has no plan. Patient denies any homicidal ideation. MEDICAL DECISION MAKING   Initial Assessment:   Pt is a 41 yo male with past medical history of major depressive disorder and alcohol use disorder who presents today with complaint of suicidal ideation and [facility] per police. Patient differential diagnosis includes but is not limited to intoxication, suicidal ideation, homicidal ideation, and depression. Plan:   Labs  ED observation  Reevaluate once sober    Patient is intoxicated. Patient will need to be clinical sober for reassessment. ED RESULTS   Laboratory results:  Labs Reviewed   CBC WITH AUTO DIFFERENTIAL - Abnormal; Notable for the following components:       Result Value    Hemoglobin 18.7 (*)     Hematocrit 55.1 (*)     All other components within normal limits   BASIC METABOLIC PANEL W/ REFLEX TO MG FOR LOW K - Abnormal; Notable for the following components:    Sodium 148 (*)     CO2 22 (*)     All other components within normal limits   HEPATIC FUNCTION PANEL - Abnormal; Notable for the following components:     Total Bilirubin 0.2 (*)     All other components within normal limits   SALICYLATE LEVEL - Abnormal; Notable for the following components:    Salicylate, Serum < 0.3 (*)     All other components within normal limits   ANION GAP - Abnormal; Notable for the following components:    Anion Gap 18.0 (*)     All other components within normal limits   ETHANOL   ACETAMINOPHEN LEVEL   GLOMERULAR FILTRATION RATE, ESTIMATED   OSMOLALITY       Radiologic studies results:  No orders to display       ED Medications administered this visit:   Medications   0.9 % sodium chloride bolus (has no administration in time range)         ED COURSE         Strict return precautions and follow up instructions were discussed with the patient prior to discharge, with which the patient agrees. MEDICATION CHANGES     New Prescriptions    No medications on file         FINAL DISPOSITION     Final diagnoses:   Acute alcoholic intoxication without complication ([facility] [address]     Condition: condition: good  Dispo: will have oncoming resident determine dispo. This transcription was electronically signed. Parts of this transcriptions may have been dictated by use of voice recognition software and electronically transcribed, and parts may have been transcribed with the assistance of an ED scribe. The transcription may contain errors not detected in proofreading. Please refer to my supervising physician's documentation if my documentation differs.     Electronically Signed: Kaity Cooper MD, 10/30/22, 6:55 AM        Kaity Cooper MD  Resident  10/30/22 2281

## 2022-10-30 NOTE — PROGRESS NOTES
Chief Complaint:  Suicidal       Provisional Diagnosis: Depression     Risk, Psychosocial and Contextual Factors: (homeless, lack of social support etc.): recent death in family     Current  Treatment:   Denied     Present Suicidal Behavior:    Verbal:denied     Attempt:denied     Access to Weapons:denied    C-SSRS Current Suicide Risk: Low, Moderate or High:  low      Past Suicidal Behavior:    Verbal: yes    Attempts: denied     Self-Injurious/Self-Mutilation: (Specify)denied    Traumatic Event Within Past 2 Weeks: (Specify) denied      Current Abuse:  (Specify)denied    Legal: (Specify)denied     Violence: (Specify) denied     Protective Factors:  family, friends, work, housing      Housing:Patient lives in his own home and is moving tomorrow. Clinical Summary:    Patient is a 42 yea old male who presents to the ED on [facility] via [employer] reporting patient stated he did not want to live anymore, however does not want to live the life he has. Patient reports that he needs help. Patient denies plan. Upon assessment patient reports that he was drinking last night and began to think about his life and a friend that recently passed. Patient denies current suicidal thoughts and or plans. Patient reports that he misses his friend, but would never do something to intentionally end his life. Homicidal thoughts and or plans denied. Delusions/hallucinations denied. Illicit drug use denied. Patient reports taht he drinks on the weekends occasionally. Level of Care Disposition:      Consulted with medical provider. Patient is medically stabilized. Consulted with patients RN about abnormalities or medical concerns. No abnormalities or medical concerns noted. Consulted with . Patient to be discharged. Provider informed.

## 2022-10-30 NOTE — ED NOTES
Patient in bed lying on side with eyes closed. Patient appears to be resting at this time. Patient respirations are regular and unlabored. Patient vital signs are stable and respirations are noted. Will continue to monitor patient. Patient is in ligature resistant safe room and officer/ are in the control room watching the monitor at this time.          Bull Mckinnon, JESSICA  10/30/22 7541

## 2022-10-30 NOTE — ED NOTES
Patient resting in bed. Respirations easy and unlabored. No distress noted. Call light within reach. Sitter at bedside.       Salome Francois, JESSICA  10/30/22 0438

## 2022-10-30 NOTE — ED NOTES
This RN to the bedside. Patient expresses no needs except his light be turned back off. No distress noted. Respirations regular and unlabored. Will continue to monitor.      Issac Dubin, RN  10/30/22 9495

## 2023-02-13 ENCOUNTER — HOSPITAL ENCOUNTER (EMERGENCY)
Age: 38
Discharge: LEFT BEFORE DISCHARGE | End: 2023-02-14
Attending: STUDENT IN AN ORGANIZED HEALTH CARE EDUCATION/TRAINING PROGRAM
Payer: MEDICAID

## 2023-02-13 DIAGNOSIS — F19.10 POLYSUBSTANCE ABUSE (HCC): Primary | ICD-10-CM

## 2023-02-13 DIAGNOSIS — Z53.21 ELOPED FROM EMERGENCY DEPARTMENT: ICD-10-CM

## 2023-02-13 PROCEDURE — 99284 EMERGENCY DEPT VISIT MOD MDM: CPT

## 2023-02-14 VITALS
TEMPERATURE: 97.7 F | OXYGEN SATURATION: 99 % | HEIGHT: 73 IN | WEIGHT: 180 LBS | SYSTOLIC BLOOD PRESSURE: 127 MMHG | HEART RATE: 94 BPM | DIASTOLIC BLOOD PRESSURE: 89 MMHG | RESPIRATION RATE: 16 BRPM | BODY MASS INDEX: 23.86 KG/M2

## 2023-02-14 LAB
ALBUMIN SERPL BCG-MCNC: 5 G/DL (ref 3.5–5.1)
ALP SERPL-CCNC: 112 U/L (ref 38–126)
ALT SERPL W/O P-5'-P-CCNC: 53 U/L (ref 11–66)
ANION GAP SERPL CALC-SCNC: 12 MEQ/L (ref 8–16)
APAP SERPL-MCNC: < 5 UG/ML (ref 0–20)
AST SERPL-CCNC: 39 U/L (ref 5–40)
BASOPHILS ABSOLUTE: 0.1 THOU/MM3 (ref 0–0.1)
BASOPHILS NFR BLD AUTO: 0.8 %
BILIRUB CONJ SERPL-MCNC: < 0.2 MG/DL (ref 0–0.3)
BILIRUB SERPL-MCNC: 0.3 MG/DL (ref 0.3–1.2)
BUN SERPL-MCNC: 9 MG/DL (ref 7–22)
CALCIUM SERPL-MCNC: 9.5 MG/DL (ref 8.5–10.5)
CHLORIDE SERPL-SCNC: 103 MEQ/L (ref 98–111)
CO2 SERPL-SCNC: 25 MEQ/L (ref 23–33)
CREAT SERPL-MCNC: 0.8 MG/DL (ref 0.4–1.2)
DEPRECATED RDW RBC AUTO: 44.6 FL (ref 35–45)
EOSINOPHIL NFR BLD AUTO: 1.1 %
EOSINOPHILS ABSOLUTE: 0.1 THOU/MM3 (ref 0–0.4)
ERYTHROCYTE [DISTWIDTH] IN BLOOD BY AUTOMATED COUNT: 13.8 % (ref 11.5–14.5)
ETHANOL SERPL-MCNC: 0.26 %
GFR SERPL CREATININE-BSD FRML MDRD: > 60 ML/MIN/1.73M2
GLUCOSE SERPL-MCNC: 110 MG/DL (ref 70–108)
HCT VFR BLD AUTO: 50.7 % (ref 42–52)
HGB BLD-MCNC: 17.3 GM/DL (ref 14–18)
IMM GRANULOCYTES # BLD AUTO: 0.02 THOU/MM3 (ref 0–0.07)
IMM GRANULOCYTES NFR BLD AUTO: 0.3 %
LYMPHOCYTES ABSOLUTE: 3.6 THOU/MM3 (ref 1–4.8)
LYMPHOCYTES NFR BLD AUTO: 45.4 %
MCH RBC QN AUTO: 30.3 PG (ref 26–33)
MCHC RBC AUTO-ENTMCNC: 34.1 GM/DL (ref 32.2–35.5)
MCV RBC AUTO: 88.8 FL (ref 80–94)
MONOCYTES ABSOLUTE: 0.5 THOU/MM3 (ref 0.4–1.3)
MONOCYTES NFR BLD AUTO: 6 %
NEUTROPHILS NFR BLD AUTO: 46.4 %
NRBC BLD AUTO-RTO: 0 /100 WBC
PLATELET # BLD AUTO: 371 THOU/MM3 (ref 130–400)
PMV BLD AUTO: 9.4 FL (ref 9.4–12.4)
POTASSIUM SERPL-SCNC: 4.2 MEQ/L (ref 3.5–5.2)
PROT SERPL-MCNC: 8.2 G/DL (ref 6.1–8)
RBC # BLD AUTO: 5.71 MILL/MM3 (ref 4.7–6.1)
SALICYLATES SERPL-MCNC: < 0.3 MG/DL (ref 2–10)
SEGMENTED NEUTROPHILS ABSOLUTE COUNT: 3.7 THOU/MM3 (ref 1.8–7.7)
SODIUM SERPL-SCNC: 140 MEQ/L (ref 135–145)
WBC # BLD AUTO: 7.9 THOU/MM3 (ref 4.8–10.8)

## 2023-02-14 PROCEDURE — 36415 COLL VENOUS BLD VENIPUNCTURE: CPT

## 2023-02-14 PROCEDURE — 85025 COMPLETE CBC W/AUTO DIFF WBC: CPT

## 2023-02-14 PROCEDURE — 93005 ELECTROCARDIOGRAM TRACING: CPT | Performed by: STUDENT IN AN ORGANIZED HEALTH CARE EDUCATION/TRAINING PROGRAM

## 2023-02-14 PROCEDURE — 80053 COMPREHEN METABOLIC PANEL: CPT

## 2023-02-14 PROCEDURE — 80179 DRUG ASSAY SALICYLATE: CPT

## 2023-02-14 PROCEDURE — 82248 BILIRUBIN DIRECT: CPT

## 2023-02-14 PROCEDURE — 80143 DRUG ASSAY ACETAMINOPHEN: CPT

## 2023-02-14 PROCEDURE — 93010 ELECTROCARDIOGRAM REPORT: CPT | Performed by: INTERNAL MEDICINE

## 2023-02-14 PROCEDURE — 82077 ASSAY SPEC XCP UR&BREATH IA: CPT

## 2023-02-14 ASSESSMENT — PAIN - FUNCTIONAL ASSESSMENT: PAIN_FUNCTIONAL_ASSESSMENT: NONE - DENIES PAIN

## 2023-02-14 ASSESSMENT — ENCOUNTER SYMPTOMS
SHORTNESS OF BREATH: 0
RHINORRHEA: 0

## 2023-02-14 NOTE — ED TRIAGE NOTES
Pt presents to the ED for an alcohol problem and seeking a mental health evaluation. Pt states that he has never had any seizures or Dts when coming off of alcohol. This RN then asked patient about the goal of his visit today. Pt then stated to this RN \"I want to get admitted to 4E\". Pt reports that he has been to [facility] and other outpatient resources and has not been getting the consistent help from these facilities that he needs to stay sober. Pt reports that he struggles with anxiety and depression and it is worse when he is sober which in turn leads him to drink again. Pt is very tearful throughout the duration of triage. Pt is alert and oriented, respirations are equal and unlabored. Pt admits to the use of ETOH today but is unable to estimate approx how much he has had today.

## 2023-02-14 NOTE — ED PROVIDER NOTES
[facility] ENCOUNTER          Pt Name: Tuan Martinez  MRN: [id number]  [place] 1985  Date of evaluation: 2023  Emergency Physician: Nain Johnson DO  Supervising Physician: Dr. Vinicius Snider       Chief Complaint   Patient presents with    Alcohol Problem    Mental Health Problem     History obtained from the patient. HISTORY OF PRESENT ILLNESS    HPI  Tuan Martinez is a 34 y.o. male who presents to the emergency department for evaluation of alcohol and mental health problem. The patient was drove to the emergency department by his girlfriend. He is requesting \"help\" for life stressors, and he specifically talks about how 2 of his son's friends . He states that one of his son's friends  to gun violence and another  from suicide after shooting himself. He states that he feels like he would get better if he were admitted to the hospital on 4E. He states \"I am not suicidal, but you guys tell me I need to be suicidal in order for you to help me. \"  He denies suicidal ideation or attempt today. He reported to the patient's nurse that he has been to [facility] and other outpatient resources and has not been getting the consistent help from these facilities that he needs to stay sober, and that he struggles with anxiety and depression and it is worse when he is sober which in turn leads him to drink again. The patient reports that he smoked a cigarette shortly prior to arrival and marijuana 1 hour before that. He reports drinking alcohol today and states that he drank [place] lot. \"  He also reports that he snorted 2 lines of cocaine at 10 PM yesterday. The patient has no other acute complaints at this time. REVIEW OF SYSTEMS   Review of Systems   Constitutional:  Negative for fever. HENT:  Negative for congestion and rhinorrhea. Respiratory:  Negative for shortness of breath.     Cardiovascular:  Negative for chest pain. PAST MEDICAL AND SURGICAL HISTORY     Past Medical History:   Diagnosis Date    Anxiety     Depression      Past Surgical History:   Procedure Laterality Date    HAND SURGERY Right 2004         MEDICATIONS   No current facility-administered medications for this encounter. No current outpatient medications on file. SOCIAL HISTORY     Social History     Social History Narrative    Not on file     Social History     Tobacco Use    Smoking status: Every Day     Packs/day: 1.00     Types: Cigarettes    Smokeless tobacco: Current     Types: Chew   Vaping Use    Vaping Use: Never used   Substance Use Topics    Alcohol use: Yes     Comment: Daily    Drug use: Yes     Types: Marijuana Annette Celaya)     Comment: 4-         ALLERGIES   No Known Allergies      FAMILY HISTORY     Family History   Problem Relation Age of Onset    Depression Mother     Heart Disease Mother     Depression Father     Heart Disease Father     Early Death Father     Substance Abuse Father     Substance Abuse Maternal Uncle     Substance Abuse Paternal Uncle     Arthritis Maternal Grandmother     Diabetes Maternal Grandmother          PHYSICAL EXAM     ED Triage Vitals [02/14/23 0004]   BP Temp Temp Source Heart Rate Resp SpO2 Height Weight   127/89 97.7 °F (36.5 °C) Oral 94 16 99 % 6' 1\" (1.854 m) 180 lb (81.6 kg)         Additional Vital Signs:  Vitals:    02/14/23 0004   BP: 127/89   Pulse: 94   Resp: 16   Temp: 97.7 °F (36.5 °C)   SpO2: 99%       Physical Exam  Vitals and nursing note reviewed. Constitutional:       General: He is not in acute distress. Comments: The patient appears clinically intoxicated. HENT:      Head: Normocephalic and atraumatic. Nose: Nose normal. No congestion or rhinorrhea. Mouth/Throat:      Mouth: Mucous membranes are moist.      Pharynx: Oropharynx is clear. No oropharyngeal exudate or posterior oropharyngeal erythema.    Eyes:      Extraocular Movements: Extraocular movements intact. Pupils: Pupils are equal, round, and reactive to light. Cardiovascular:      Rate and Rhythm: Normal rate and regular rhythm. Heart sounds: Normal heart sounds. Pulmonary:      Effort: Pulmonary effort is normal.      Breath sounds: Normal breath sounds. Musculoskeletal:         General: Normal range of motion. Cervical back: Normal range of motion. Skin:     General: Skin is warm and dry. Neurological:      General: No focal deficit present. Mental Status: He is alert and oriented to person, place, and time. INITIAL IMPRESSION, DIFFERENTIAL DIAGNOSIS, AND PLAN   Initial Assessment: Given the patient's above chief complaint and findings on history and physical examination, I thought it was appropriate to consider the following emergency medical conditions: Anemia, electrolyte abnormality, cardiac toxicity, coingestion of acetaminophen or salicylate, polysubstance abuse, ethanol intoxication, hepatitis, arrhythmia, rhabdomyolysis. Although some of these diagnoses are unlikely they were considered in my medical decision making. Chronic Conditions considered:   Patient Active Problem List   Diagnosis    Alcoholic ([facility] [address]    Substance induced mood disorder ([facility] [address]    Anxiety    Major depressive disorder, recurrent episode, severe with anxious distress ([facility] [address]    Alcohol use disorder    Depression affecting pregnancy    Depression with suicidal ideation       Diagnostic: Labs, EKG, UA, UDS    Therapeutic:    ED RESULTS   Laboratory results:  Labs Reviewed   SALICYLATE LEVEL - Abnormal; Notable for the following components:       Result Value    Salicylate, Serum < 0.3 (*)     All other components within normal limits   BASIC METABOLIC PANEL - Abnormal; Notable for the following components:    Glucose 110 (*)     All other components within normal limits   HEPATIC FUNCTION PANEL - Abnormal; Notable for the following components:     Total Protein 8.2 (*)     All other components within normal limits   ETHANOL   ACETAMINOPHEN LEVEL   CBC WITH AUTO DIFFERENTIAL   ANION GAP   GLOMERULAR FILTRATION RATE, ESTIMATED   URINE DRUG SCREEN   URINALYSIS WITH REFLEX TO CULTURE   TROPONIN   CK       Radiologic studies results:  No orders to display       ED Medications administered this visit: Medications - No data to display      Moises Garcia     ED Course as of 02/14/23 0225   Tue Feb 14, 2023   0144 ETHYL ALCOHOL, SERUM: 0.26 [DO]      ED Course User Index  [DO] Cindia Me, DO     Available laboratory and imaging results were independently reviewed and clinically correlated. Decision Rules/Clinical Scores utilized: Kathleen Major Code Status:  Not addressed during this ED visit    [facility] Social determinants of health considered to potentially effect treatment and/or disposition plan:    Healthy People 2030, U.[place]. Department of Health and Human Services, Office of Disease Prevention and Health Promotion. Substance abuse     Medical Commodities impacting treatment or disposition:  Not Applicable. Past Medical History:   Diagnosis Date    Anxiety     Depression        Consultants: Not Applicable. Final Assessment and Plan:   The patient was noted to have an elevated ethanol level of 0.26. A level B was paged to [facility]. On nursing reassessment, the patient was not observed to be in the room and had apparently eloped from the emergency department. The diagnosis, extensive differential diagnosis, plan of care, laboratory, and imaging findings were discussed at the bedside. All questions and concerns were addressed at the time of the encounter. MEDICATION CHANGES     DISCHARGE MEDICATIONS:  There are no discharge medications for this patient. FINAL DISPOSITION     Final diagnoses:   Polysubstance abuse ([address]   Eloped from emergency department     Condition: condition: fair  Dispo: Other Disposition: Eloped    PATIENT REFERRED TO:  No follow-up provider specified.     Critical Care Time   CRITICAL CARE:  None    PROCEDURES: (None if blank)  Procedures:   Medical Decision Making      This transcription was electronically signed. Parts of this transcriptions may have been dictated by use of voice recognition software and electronically transcribed, and parts may have been transcribed with the assistance of an ED scribe. The transcription may contain errors not detected in proofreading.     Electronically Signed: Cynthia Reagan DO, 02/14/23, 2:25 AM        Cynthia Reagan DO  Resident  02/14/23 6971

## 2023-02-14 NOTE — PROGRESS NOTES
Patient is a [age] year old male who presents to the ER with alcohol use concerns. Patient reports he has been drinking for the past twelve hours. Patient states he is 'not suicidal , I don't want to hurt myself'. Patient is looking for help to decrease his alcohol use.

## 2023-02-17 LAB
EKG ATRIAL RATE: 86 BPM
EKG P AXIS: 52 DEGREES
EKG P-R INTERVAL: 130 MS
EKG Q-T INTERVAL: 356 MS
EKG QRS DURATION: 90 MS
EKG QTC CALCULATION (BAZETT): 426 MS
EKG R AXIS: 74 DEGREES
EKG T AXIS: 31 DEGREES
EKG VENTRICULAR RATE: 86 BPM

## 2023-05-01 ENCOUNTER — HOSPITAL ENCOUNTER (INPATIENT)
Age: 38
LOS: 3 days | Discharge: HOME | DRG: 753 | End: 2023-05-04
Attending: PSYCHIATRY & NEUROLOGY | Admitting: PSYCHIATRY & NEUROLOGY
Payer: MEDICAID

## 2023-05-01 DIAGNOSIS — R45.851 SUICIDAL IDEATION: Primary | ICD-10-CM

## 2023-05-01 DIAGNOSIS — F10.920 ACUTE ALCOHOLIC INTOXICATION WITHOUT COMPLICATION (HCC): ICD-10-CM

## 2023-05-01 PROBLEM — F32.9 MDD (MAJOR DEPRESSIVE DISORDER), SINGLE EPISODE: Status: ACTIVE | Noted: 2023-05-01

## 2023-05-01 PROBLEM — F31.4 BIPOLAR AFFECTIVE DISORDER, DEPRESSED, SEVERE (HCC): Status: ACTIVE | Noted: 2023-05-01

## 2023-05-01 LAB
ALBUMIN SERPL BCG-MCNC: 4.7 G/DL (ref 3.5–5.1)
ALP SERPL-CCNC: 110 U/L (ref 38–126)
ALT SERPL W/O P-5'-P-CCNC: 48 U/L (ref 11–66)
AMPHETAMINES UR QL SCN: POSITIVE
ANION GAP SERPL CALC-SCNC: 17 MEQ/L (ref 8–16)
APAP SERPL-MCNC: < 5 UG/ML (ref 0–20)
AST SERPL-CCNC: 33 U/L (ref 5–40)
BACTERIA URNS QL MICRO: ABNORMAL /HPF
BARBITURATES UR QL SCN: NEGATIVE
BASOPHILS ABSOLUTE: 0.1 THOU/MM3 (ref 0–0.1)
BASOPHILS NFR BLD AUTO: 0.6 %
BENZODIAZ UR QL SCN: NEGATIVE
BILIRUB SERPL-MCNC: 0.3 MG/DL (ref 0.3–1.2)
BILIRUB UR QL STRIP.AUTO: NEGATIVE
BUN SERPL-MCNC: 6 MG/DL (ref 7–22)
BZE UR QL SCN: POSITIVE
CALCIUM SERPL-MCNC: 9.4 MG/DL (ref 8.5–10.5)
CANNABINOIDS UR QL SCN: POSITIVE
CASTS #/AREA URNS LPF: ABNORMAL /LPF
CASTS 2: ABNORMAL /LPF
CHARACTER UR: CLEAR
CHLORIDE SERPL-SCNC: 108 MEQ/L (ref 98–111)
CO2 SERPL-SCNC: 22 MEQ/L (ref 23–33)
COLOR: YELLOW
CREAT SERPL-MCNC: 0.7 MG/DL (ref 0.4–1.2)
CRYSTALS URNS MICRO: ABNORMAL
DEPRECATED RDW RBC AUTO: 42.5 FL (ref 35–45)
EOSINOPHIL NFR BLD AUTO: 1.7 %
EOSINOPHILS ABSOLUTE: 0.2 THOU/MM3 (ref 0–0.4)
EPITHELIAL CELLS, UA: ABNORMAL /HPF
ERYTHROCYTE [DISTWIDTH] IN BLOOD BY AUTOMATED COUNT: 13 % (ref 11.5–14.5)
ETHANOL SERPL-MCNC: 0.05 %
ETHANOL SERPL-MCNC: 0.14 %
FENTANYL: NEGATIVE
GFR SERPL CREATININE-BSD FRML MDRD: > 60 ML/MIN/1.73M2
GLUCOSE SERPL-MCNC: 102 MG/DL (ref 70–108)
GLUCOSE UR QL STRIP.AUTO: NEGATIVE MG/DL
HCT VFR BLD AUTO: 51.4 % (ref 42–52)
HGB BLD-MCNC: 17.8 GM/DL (ref 14–18)
HGB UR QL STRIP.AUTO: ABNORMAL
IMM GRANULOCYTES # BLD AUTO: 0.02 THOU/MM3 (ref 0–0.07)
IMM GRANULOCYTES NFR BLD AUTO: 0.2 %
KETONES UR QL STRIP.AUTO: NEGATIVE
LYMPHOCYTES ABSOLUTE: 4.3 THOU/MM3 (ref 1–4.8)
LYMPHOCYTES NFR BLD AUTO: 45 %
MCH RBC QN AUTO: 31 PG (ref 26–33)
MCHC RBC AUTO-ENTMCNC: 34.6 GM/DL (ref 32.2–35.5)
MCV RBC AUTO: 89.4 FL (ref 80–94)
MISCELLANEOUS 2: ABNORMAL
MONOCYTES ABSOLUTE: 0.6 THOU/MM3 (ref 0.4–1.3)
MONOCYTES NFR BLD AUTO: 5.9 %
NEUTROPHILS NFR BLD AUTO: 46.6 %
NITRITE UR QL STRIP: NEGATIVE
NRBC BLD AUTO-RTO: 0 /100 WBC
OPIATES UR QL SCN: NEGATIVE
OSMOLALITY SERPL CALC.SUM OF ELEC: 290.2 MOSMOL/KG (ref 275–300)
OXYCODONE: NEGATIVE
PCP UR QL SCN: NEGATIVE
PH UR STRIP.AUTO: 6 [PH] (ref 5–9)
PLATELET # BLD AUTO: 399 THOU/MM3 (ref 130–400)
PMV BLD AUTO: 9.5 FL (ref 9.4–12.4)
POTASSIUM SERPL-SCNC: 3.6 MEQ/L (ref 3.5–5.2)
PROT SERPL-MCNC: 7.6 G/DL (ref 6.1–8)
PROT UR STRIP.AUTO-MCNC: ABNORMAL MG/DL
RBC # BLD AUTO: 5.75 MILL/MM3 (ref 4.7–6.1)
RBC URINE: > 200 /HPF
RENAL EPI CELLS #/AREA URNS HPF: ABNORMAL /[HPF]
SALICYLATES SERPL-MCNC: < 0.3 MG/DL (ref 2–10)
SEGMENTED NEUTROPHILS ABSOLUTE COUNT: 4.4 THOU/MM3 (ref 1.8–7.7)
SODIUM SERPL-SCNC: 147 MEQ/L (ref 135–145)
SP GR UR REFRACT.AUTO: 1.01 (ref 1–1.03)
UROBILINOGEN, URINE: 1 EU/DL (ref 0–1)
WBC # BLD AUTO: 9.5 THOU/MM3 (ref 4.8–10.8)
WBC #/AREA URNS HPF: ABNORMAL /HPF
WBC #/AREA URNS HPF: ABNORMAL /[HPF]
YEAST LIKE FUNGI URNS QL MICRO: ABNORMAL

## 2023-05-01 PROCEDURE — 85025 COMPLETE CBC W/AUTO DIFF WBC: CPT

## 2023-05-01 PROCEDURE — 80179 DRUG ASSAY SALICYLATE: CPT

## 2023-05-01 PROCEDURE — 6370000000 HC RX 637 (ALT 250 FOR IP): Performed by: PHYSICIAN ASSISTANT

## 2023-05-01 PROCEDURE — 80053 COMPREHEN METABOLIC PANEL: CPT

## 2023-05-01 PROCEDURE — 36415 COLL VENOUS BLD VENIPUNCTURE: CPT

## 2023-05-01 PROCEDURE — APPSS30 APP SPLIT SHARED TIME 16-30 MINUTES: Performed by: PHYSICIAN ASSISTANT

## 2023-05-01 PROCEDURE — 1240000000 HC EMOTIONAL WELLNESS R&B

## 2023-05-01 PROCEDURE — 6370000000 HC RX 637 (ALT 250 FOR IP): Performed by: PSYCHIATRY & NEUROLOGY

## 2023-05-01 PROCEDURE — 80307 DRUG TEST PRSMV CHEM ANLYZR: CPT

## 2023-05-01 PROCEDURE — 80143 DRUG ASSAY ACETAMINOPHEN: CPT

## 2023-05-01 PROCEDURE — 82077 ASSAY SPEC XCP UR&BREATH IA: CPT

## 2023-05-01 PROCEDURE — 99285 EMERGENCY DEPT VISIT HI MDM: CPT

## 2023-05-01 PROCEDURE — 81001 URINALYSIS AUTO W/SCOPE: CPT

## 2023-05-01 RX ORDER — PRAZOSIN HYDROCHLORIDE 1 MG/1
1 CAPSULE ORAL NIGHTLY
COMMUNITY

## 2023-05-01 RX ORDER — QUETIAPINE FUMARATE 100 MG/1
50 TABLET, FILM COATED ORAL NIGHTLY
COMMUNITY

## 2023-05-01 RX ORDER — QUETIAPINE FUMARATE 25 MG/1
50 TABLET, FILM COATED ORAL NIGHTLY
Status: DISCONTINUED | OUTPATIENT
Start: 2023-05-01 | End: 2023-05-04 | Stop reason: HOSPADM

## 2023-05-01 RX ORDER — NICOTINE 21 MG/24HR
1 PATCH, TRANSDERMAL 24 HOURS TRANSDERMAL DAILY
Status: DISCONTINUED | OUTPATIENT
Start: 2023-05-01 | End: 2023-05-04 | Stop reason: HOSPADM

## 2023-05-01 RX ORDER — ACETAMINOPHEN 325 MG/1
650 TABLET ORAL EVERY 4 HOURS PRN
Status: DISCONTINUED | OUTPATIENT
Start: 2023-05-01 | End: 2023-05-04 | Stop reason: HOSPADM

## 2023-05-01 RX ORDER — ACAMPROSATE CALCIUM 333 MG/1
333 TABLET, DELAYED RELEASE ORAL 3 TIMES DAILY
Status: ON HOLD | COMMUNITY
End: 2023-05-04 | Stop reason: HOSPADM

## 2023-05-01 RX ORDER — PRAZOSIN HYDROCHLORIDE 1 MG/1
1 CAPSULE ORAL NIGHTLY
Status: DISCONTINUED | OUTPATIENT
Start: 2023-05-01 | End: 2023-05-04 | Stop reason: HOSPADM

## 2023-05-01 RX ORDER — IBUPROFEN 400 MG/1
400 TABLET ORAL EVERY 6 HOURS PRN
Status: DISCONTINUED | OUTPATIENT
Start: 2023-05-01 | End: 2023-05-04 | Stop reason: HOSPADM

## 2023-05-01 RX ORDER — MAGNESIUM HYDROXIDE/ALUMINUM HYDROXICE/SIMETHICONE 120; 1200; 1200 MG/30ML; MG/30ML; MG/30ML
30 SUSPENSION ORAL EVERY 6 HOURS PRN
Status: DISCONTINUED | OUTPATIENT
Start: 2023-05-01 | End: 2023-05-04 | Stop reason: HOSPADM

## 2023-05-01 RX ORDER — TRAZODONE HYDROCHLORIDE 50 MG/1
50 TABLET ORAL NIGHTLY PRN
Status: DISCONTINUED | OUTPATIENT
Start: 2023-05-01 | End: 2023-05-04 | Stop reason: HOSPADM

## 2023-05-01 RX ORDER — CHLORDIAZEPOXIDE HYDROCHLORIDE 10 MG/1
10 CAPSULE, GELATIN COATED ORAL 3 TIMES DAILY
Status: DISCONTINUED | OUTPATIENT
Start: 2023-05-01 | End: 2023-05-02

## 2023-05-01 RX ORDER — HYDROXYZINE HYDROCHLORIDE 25 MG/1
50 TABLET, FILM COATED ORAL 3 TIMES DAILY PRN
Status: DISCONTINUED | OUTPATIENT
Start: 2023-05-01 | End: 2023-05-04 | Stop reason: HOSPADM

## 2023-05-01 RX ADMIN — TRAZODONE HYDROCHLORIDE 50 MG: 50 TABLET ORAL at 21:33

## 2023-05-01 RX ADMIN — PRAZOSIN HYDROCHLORIDE 1 MG: 1 CAPSULE ORAL at 21:14

## 2023-05-01 RX ADMIN — ACETAMINOPHEN 650 MG: 325 TABLET ORAL at 07:57

## 2023-05-01 RX ADMIN — IBUPROFEN 400 MG: 400 TABLET, FILM COATED ORAL at 21:38

## 2023-05-01 RX ADMIN — QUETIAPINE FUMARATE 50 MG: 25 TABLET ORAL at 21:15

## 2023-05-01 ASSESSMENT — PAIN - FUNCTIONAL ASSESSMENT
PAIN_FUNCTIONAL_ASSESSMENT: NONE - DENIES PAIN
PAIN_FUNCTIONAL_ASSESSMENT: NONE - DENIES PAIN
PAIN_FUNCTIONAL_ASSESSMENT: ACTIVITIES ARE NOT PREVENTED

## 2023-05-01 ASSESSMENT — SLEEP AND FATIGUE QUESTIONNAIRES
DO YOU HAVE DIFFICULTY SLEEPING: NO
AVERAGE NUMBER OF SLEEP HOURS: 8
DO YOU HAVE DIFFICULTY SLEEPING: NO
DO YOU USE A SLEEP AID: YES
SLEEP PATTERN: NORMAL
DO YOU USE A SLEEP AID: YES
AVERAGE NUMBER OF SLEEP HOURS: 8
SLEEP PATTERN: UTA

## 2023-05-01 ASSESSMENT — PATIENT HEALTH QUESTIONNAIRE - PHQ9: SUM OF ALL RESPONSES TO PHQ QUESTIONS 1-9: 21

## 2023-05-01 ASSESSMENT — PAIN DESCRIPTION - ONSET: ONSET: GRADUAL

## 2023-05-01 ASSESSMENT — PAIN DESCRIPTION - FREQUENCY: FREQUENCY: INTERMITTENT

## 2023-05-01 ASSESSMENT — LIFESTYLE VARIABLES
HOW OFTEN DO YOU HAVE A DRINK CONTAINING ALCOHOL: PATIENT DECLINED
HOW MANY STANDARD DRINKS CONTAINING ALCOHOL DO YOU HAVE ON A TYPICAL DAY: PATIENT DECLINED

## 2023-05-01 ASSESSMENT — PAIN DESCRIPTION - PAIN TYPE: TYPE: ACUTE PAIN

## 2023-05-01 ASSESSMENT — PAIN SCALES - GENERAL
PAINLEVEL_OUTOF10: 3
PAINLEVEL_OUTOF10: 3
PAINLEVEL_OUTOF10: 0

## 2023-05-01 ASSESSMENT — PAIN DESCRIPTION - DESCRIPTORS: DESCRIPTORS: ACHING;DULL

## 2023-05-01 ASSESSMENT — PAIN DESCRIPTION - ORIENTATION: ORIENTATION: RIGHT;UPPER

## 2023-05-01 ASSESSMENT — PAIN DESCRIPTION - LOCATION: LOCATION: TEETH

## 2023-05-01 NOTE — ED TRIAGE NOTES
Pt presents to the ED through triage, tearful, with c/c suicidal thoughts. Pt reports that he's had thoughts of hanging himself. When asked about triggers at home, pt reports \"I just have a lot of built up anxiety\". Pt denies pain at this time. Vitals stable. \"Mostly\" complaint with medications. Reports drinking 5-10 shots of liquor per day.

## 2023-05-01 NOTE — BH NOTE
INPATIENT RECREATIONAL THERAPY  ADULT BEHAVIORAL SERVICES  EVALUATION    REFERRING PHYSICIAN:   Dr. Fredo Osborn  DIAGNOSIS:   Bipolar Affective Disorder, Depressed, Severe  PRECAUTIONS:  Standard precautions    HISTORY OF PRESENT ILLNESS/INJURY:  Patient was admitted to the unit due to suicidal ideation and depression. Patient reported a plan to hang himself. Patient reported a recent relapse on alcohol, methamphetamines, marijuana and crack cocaine prior to admission and he stated that he is currently on probation. Patient guarded,  isolating in his room and just wants to sleep. PMH:  Please see medical chart for prior medical history, allergies, and medication    HISTORY OF PSYCHIATRIC TREATMENT:  IP:    [facility]  OP:  Kimi Benson OF BIRTH:  [birth year]  GENDER:   male  MARITAL STATUS:  Patient has a girlfriend. Patient has 3 children (2 adult)  EMPLOYMENT STATUS:  Patient is employed at [employer]. LIVING SITUATION/SUPPORT:   Lives with a friend. EDUCATIONAL LEVEL:   Graduate    MEDICATION/DRUG USE:  Alcohol abuse. Recent relapse. Crack cocaine abuse. Recent relapse. Methamphetamine abuse. Recent relapse. Marijuana use. LEISURE INTERESTS:   activities with friends, activities with his girlfriend, family activities, cooking, watching TV/Movies, listening to music  ACTIVITY PREFERENCE:  Individual - isolating in room  ACTIVITY TYPES:   Passive. Indoor. Outdoor. Active. COGNITION: A&Ox4    COPING:   poor  ATTENTION:  fair  RELAXATION:  Patient reported anxiety. SELF-ESTEEM:   poor  MOTIVATION:   poor    SOCIAL SKILLS:  fair - isolating in room  FRUSTRATION TOLERANCE:   poor - patient has been convicted of assault x2.   ATTENTION SEEKING:  isolating in room  COOPERATION:  cooperative   AFFECT:  blunt  APPEARANCE:   appropriate    HEARING:  No problems noted  VISION:   no problems noted   VERBAL COMMUNICATION:   no problems but guarded  WRITTEN COMMUNICATION:   no problems

## 2023-05-01 NOTE — ED NOTES
ED to inpatient nurses report    Chief Complaint   Patient presents with    Suicidal      Present to ED from home  LOC: alert and orientated to name, place, date  Vital signs   Vitals:    05/01/23 0316 05/01/23 0604   BP: 131/85 (!) 133/92   Pulse: 97 87   Resp: 18 16   Temp: 98 °F (36.7 °C)    TempSrc: Oral    SpO2: 96% 96%   Weight: 195 lb (88.5 kg)    Height: 6' 1\" (1.854 m)       Oxygen Baseline RA    Current needs required RA Bipap/Cpap No  LDAs:    Mobility: Requires assistance * 1  Pending ED orders: NA  Present condition: STABLE    C-SSRS Risk of Suicide:  Moderate Risk  Swallow Screening    Preferred Language: English     Electronically signed by Myranda Valverde RN on 5/1/2023 at 7:06 AM       Myranda Valverde RN  05/01/23 4147

## 2023-05-01 NOTE — ED PROVIDER NOTES
[facility]  eMERGENCY dEPARTMENT eNCOUnter     Pt Name: Javier Tony  MRN: [id number]  Leonardogfke 1985  Date of evaluation: 5/1/23        Mid-level provider Note:    I have personally performed and/or participated in the history, exam and medical decision making and agree with all pertinent clinical information as noted by the previous provider. I have also reviewed and agree with the past medical, family and social history unless otherwise noted. I have personally performed a face to face diagnostic evaluation on this patient. I have reviewed the previous provider's findings and agree. Evaluation: Patient was handed off to me via [facility], awaiting alcohol clearance, alcohol was cleared at approximately 615. GIANFRANCO came and saw the patient, will admit the patient for further evaluation. Patient was agreeable with this. 1. Suicidal ideation    2. Acute alcoholic intoxication without complication ([facility] [address]          DISPOSITION/PLAN  PATIENT REFERRED TO:  No follow-up provider specified.   DISCHARGE MEDICATIONS:  New Prescriptions    No medications on file         Devver, [facility] Counts, APRN - CNP  05/01/23 7626
FOLLOW UP THE PATIENT:  No follow-up provider specified.     ELAINA Holcomb CNP, APRN - CNP  05/01/23 1093

## 2023-05-01 NOTE — H&P
and polysubstance abuse including alcohol cocaine and meth admitted for worsening suicidal thoughts and plan to kill self. Patient reports that he has been actively working but has been dealing with several life stressors as described above. Reports constant feelings of helplessness hopelessness and worthlessness. Reports poor energy and concentration. Reports having some trouble falling asleep and staying asleep. Patient did mention that he was recently diagnosed with bipolar disorder. We have some concerns that this could be is mostly secondary to his substance use than a true manic episode however we do not have any collateral information to confirm a diagnosis of unipolar versus bipolar. Discussed with him that it sounded more like he falls on the bipolar spectrum but need to discern more when he is off of any of the stimulant drugs. Patient does report that he has been drinking excessively. Patient was on acamprosate but not sure if he has been compliant on this medication. Discussed with him about restarting his home medications and titrating to effect  To help with his mood. We will have Librium available to help with any alcohol withdrawals. ASSESSMENT  Bipolar affective disorder, depressed, severe (HCC)  History of PTSD  Polysubstance abuse-alcohol, cannabis, cocaine, methamphetamine  Acute alcohol withdrawals    TREATMENT PLAN  Reviewed labs, EKG  Will obtain records/collateral information and review them today. Medication adjustment: Will restart hoe medications and titrate to effect  Will add Librium to help with withdrawals  Consults: none    Risk level: High     Behavioral Services  Medicare Certification     Admission Day 1  I certify that this patient's inpatient psychiatric hospital admission is medically necessary for:    x (1) treatment which could reasonably be expected to improve this patient's condition, or    x (2) diagnostic study or its equivalent.      Prasad Gaston is a 34

## 2023-05-01 NOTE — ED NOTES
ED to inpatient nurses report    Chief Complaint   Patient presents with    Suicidal      Present to ED from home  LOC: alert and orientated to name, place, date  Vital signs   Vitals:    05/01/23 0316 05/01/23 0604   BP: 131/85 (!) 133/92   Pulse: 97 87   Resp: 18 16   Temp: 98 °F (36.7 °C)    TempSrc: Oral    SpO2: 96% 96%   Weight: 195 lb (88.5 kg)    Height: 6' 1\" (1.854 m)       Oxygen Baseline roomair    Current needs required none Bipap/Cpap No  LDAs:    Mobility: Independent  Pending ED orders: none  Present condition: stable    C-SSRS Risk of Suicide:  Moderate Risk  Swallow Screening    Preferred Language: English     Electronically signed by Kay Hare RN on 5/1/2023 at 6:57 AM      Kay Hare RN  05/01/23 7453

## 2023-05-01 NOTE — ED NOTES
Pt transported to [phone] via wheelchair in stable condition. Floor called prior to transport, spoke with Cuca Logan. [place] [employer] called for escort.      Ed Brady  05/01/23 8998

## 2023-05-01 NOTE — ED NOTES
Pt resting on cot, provided pt with water. Voices no other needs or concerns. Call light in reach. Sitter at bedside.       Mason Corey RN  05/01/23 3667

## 2023-05-02 PROCEDURE — 1240000000 HC EMOTIONAL WELLNESS R&B

## 2023-05-02 PROCEDURE — APPSS30 APP SPLIT SHARED TIME 16-30 MINUTES: Performed by: PHYSICIAN ASSISTANT

## 2023-05-02 PROCEDURE — 6370000000 HC RX 637 (ALT 250 FOR IP): Performed by: PHYSICIAN ASSISTANT

## 2023-05-02 PROCEDURE — 6370000000 HC RX 637 (ALT 250 FOR IP): Performed by: PSYCHIATRY & NEUROLOGY

## 2023-05-02 RX ORDER — CHLORDIAZEPOXIDE HYDROCHLORIDE 5 MG/1
5 CAPSULE, GELATIN COATED ORAL 3 TIMES DAILY
Status: DISCONTINUED | OUTPATIENT
Start: 2023-05-02 | End: 2023-05-03

## 2023-05-02 RX ADMIN — CHLORDIAZEPOXIDE HYDROCHLORIDE 5 MG: 5 CAPSULE ORAL at 20:56

## 2023-05-02 RX ADMIN — ACETAMINOPHEN 650 MG: 325 TABLET ORAL at 20:57

## 2023-05-02 RX ADMIN — ACETAMINOPHEN 650 MG: 325 TABLET ORAL at 12:33

## 2023-05-02 RX ADMIN — PRAZOSIN HYDROCHLORIDE 1 MG: 1 CAPSULE ORAL at 20:56

## 2023-05-02 RX ADMIN — HYDROXYZINE HYDROCHLORIDE 50 MG: 25 TABLET, FILM COATED ORAL at 17:44

## 2023-05-02 RX ADMIN — IBUPROFEN 400 MG: 400 TABLET, FILM COATED ORAL at 17:44

## 2023-05-02 RX ADMIN — CHLORDIAZEPOXIDE HYDROCHLORIDE 5 MG: 5 CAPSULE ORAL at 13:49

## 2023-05-02 RX ADMIN — HYDROXYZINE HYDROCHLORIDE 50 MG: 25 TABLET, FILM COATED ORAL at 08:10

## 2023-05-02 RX ADMIN — Medication: at 20:58

## 2023-05-02 RX ADMIN — Medication: at 16:55

## 2023-05-02 RX ADMIN — CHLORDIAZEPOXIDE HYDROCHLORIDE 10 MG: 10 CAPSULE ORAL at 08:10

## 2023-05-02 RX ADMIN — TRAZODONE HYDROCHLORIDE 50 MG: 50 TABLET ORAL at 20:57

## 2023-05-02 RX ADMIN — QUETIAPINE FUMARATE 50 MG: 25 TABLET ORAL at 20:56

## 2023-05-02 ASSESSMENT — PAIN SCALES - GENERAL
PAINLEVEL_OUTOF10: 2
PAINLEVEL_OUTOF10: 5
PAINLEVEL_OUTOF10: 0
PAINLEVEL_OUTOF10: 0
PAINLEVEL_OUTOF10: 8
PAINLEVEL_OUTOF10: 0

## 2023-05-02 ASSESSMENT — PAIN DESCRIPTION - LOCATION
LOCATION: TEETH

## 2023-05-02 ASSESSMENT — PAIN DESCRIPTION - DESCRIPTORS
DESCRIPTORS: ACHING
DESCRIPTORS: ACHING;NAGGING

## 2023-05-02 ASSESSMENT — PAIN DESCRIPTION - ORIENTATION: ORIENTATION: RIGHT;UPPER

## 2023-05-02 ASSESSMENT — PAIN - FUNCTIONAL ASSESSMENT
PAIN_FUNCTIONAL_ASSESSMENT: ACTIVITIES ARE NOT PREVENTED
PAIN_FUNCTIONAL_ASSESSMENT: ACTIVITIES ARE NOT PREVENTED

## 2023-05-02 NOTE — GROUP NOTE
Group Therapy Note    Date: 5/2/2023    Group Start Time: 1100  Group End Time: 36  Group Topic: Healthy Living/Wellness    [facility] Adult [facility] 4E    Ronnie Jaeger LPN        Group Therapy Note    Attendees: 6      Notes:  did not attend    Discipline Responsible: Licensed Practical Nurse      Signature:  Ronnie Jaeger LPN

## 2023-05-03 PROCEDURE — 1240000000 HC EMOTIONAL WELLNESS R&B

## 2023-05-03 PROCEDURE — 6370000000 HC RX 637 (ALT 250 FOR IP): Performed by: PHYSICIAN ASSISTANT

## 2023-05-03 PROCEDURE — 6370000000 HC RX 637 (ALT 250 FOR IP): Performed by: PSYCHIATRY & NEUROLOGY

## 2023-05-03 PROCEDURE — APPSS30 APP SPLIT SHARED TIME 16-30 MINUTES: Performed by: PHYSICIAN ASSISTANT

## 2023-05-03 RX ORDER — CHLORDIAZEPOXIDE HYDROCHLORIDE 5 MG/1
5 CAPSULE, GELATIN COATED ORAL 2 TIMES DAILY
Status: DISCONTINUED | OUTPATIENT
Start: 2023-05-03 | End: 2023-05-04

## 2023-05-03 RX ADMIN — TRAZODONE HYDROCHLORIDE 50 MG: 50 TABLET ORAL at 20:35

## 2023-05-03 RX ADMIN — CHLORDIAZEPOXIDE HYDROCHLORIDE 5 MG: 5 CAPSULE ORAL at 20:35

## 2023-05-03 RX ADMIN — QUETIAPINE FUMARATE 50 MG: 25 TABLET ORAL at 20:35

## 2023-05-03 RX ADMIN — CHLORDIAZEPOXIDE HYDROCHLORIDE 5 MG: 5 CAPSULE ORAL at 08:22

## 2023-05-03 RX ADMIN — PRAZOSIN HYDROCHLORIDE 1 MG: 1 CAPSULE ORAL at 20:35

## 2023-05-03 ASSESSMENT — PAIN SCALES - GENERAL: PAINLEVEL_OUTOF10: 0

## 2023-05-03 NOTE — DISCHARGE INSTR - DIET
Good nutrition is important when healing from an illness, injury, or surgery. Follow any nutrition recommendations given to you during your hospital stay. If you were given an oral nutrition supplement while in the hospital, continue to take this supplement at home. You can take it with meals, in-between meals, and/or before bedtime. These supplements can be purchased at most local grocery stores, pharmacies, and chain [employer]-stores.    If you have any questions about your diet or nutrition, call the hospital and ask for the dietitian  Resume regular diet

## 2023-05-03 NOTE — DISCHARGE INSTRUCTIONS
[employer] Hotline:  [phone]    Crisis phone numbers:  Esme De Jesus, and [facility] GUARD BASE [facility] [phone]. Jhon Loose, and [facility] [address] [facility] [phone]. Flynn and Sandro [facility] [phone]. [address] [place], and [place] [phone]. [facility]  [address] [facility], [address] [facility] [address]  [address]  [address] [facility]  [facility] [address]  [address]  [place], [address]  [place], [address]    [facility] NORMA PETER  [address] [facility] [address] [facility], [address] [facility]  [address] [facility]  Recovery and [facility]  [address] , [place] [phone]    [facility]  [address]  [address] [facility],[address], [address]  [address] [facility] [address]  [place], [address]  [facility]  [address]  [address], 1000 [facility]  Recovery and GONZALES [facility]  [address], [address] [place]  [phone]    [employer] [facility] [place]  [address], [address]  [address]  [address]   [address]  [address] [facility]  [address]  [place], [address]  Tiffani Becker 180  [address]  [facility] 163   [facility], [address] [facility] [address]  [phone]

## 2023-05-03 NOTE — DISCHARGE INSTR - COC
Continuity of Care Form    Patient Name: Tristan Cardenas   :  1985  MRN:  [id number]    Admit date:  2023  Discharge date:  ***    Code Status Order: Full Code   Advance Directives:     Admitting Physician:  Chris Walker MD  PCP: No primary care provider on file. Discharging Nurse: [facility] Unit/Room#: 4E-67/067-B  Discharging Unit Phone Number: ***    Emergency Contact:   Extended Emergency Contact Information  Primary Emergency Contact: Fernanda ragsdale  Address: [facility] .[address] Phone: [phone]  Mobile Phone: [phone]  Relation: Parent  Preferred language: English   needed? No    Past Surgical History:  Past Surgical History:   Procedure Laterality Date    HAND SURGERY Right 2004       Immunization History:   Immunization History   Administered Date(s) Administered    TDaP, ADACEL (age 10y-63y), [address] (age 10y+), IM, 0.5mL 10/17/2019, 2020       Active Problems:  Patient Active Problem List   Diagnosis Code    Alcoholic ([address] K57.00    Substance induced mood disorder ([address] F19.94    Anxiety F41.9    Major depressive disorder, recurrent episode, severe with anxious distress ([address] F33.2    Alcohol use disorder F10.90    Depression affecting pregnancy O99.340, F32. A    Depression with suicidal ideation F32. A, R45.851    Bipolar affective disorder, depressed, severe ([address] F31.4       Isolation/Infection:   Isolation            No Isolation          Patient Infection Status       None to display            Nurse Assessment:  Last Vital Signs: /82   Pulse 79   Temp 97.8 °F (36.6 °C) (Oral)   Resp 16   Ht 6' 1\" (1.854 m)   Wt 195 lb (88.5 kg)   SpO2 97%   BMI 25.73 kg/m²     Last documented pain score (0-10 scale): Pain Level: 0  Last Weight:   Wt Readings from Last 1 Encounters:   23 195 lb (88.5 kg)     Mental Status:  {IP PT MENTAL STATUS:34770}    IV Access:  { TIMBO
Opt out

## 2023-05-04 VITALS
TEMPERATURE: 97.7 F | BODY MASS INDEX: 25.84 KG/M2 | SYSTOLIC BLOOD PRESSURE: 131 MMHG | HEIGHT: 73 IN | OXYGEN SATURATION: 96 % | HEART RATE: 85 BPM | RESPIRATION RATE: 16 BRPM | DIASTOLIC BLOOD PRESSURE: 94 MMHG | WEIGHT: 195 LBS

## 2023-05-04 PROCEDURE — 5130000000 HC BRIDGE APPOINTMENT

## 2023-05-04 RX ORDER — HYDROXYZINE 50 MG/1
50 TABLET, FILM COATED ORAL 3 TIMES DAILY PRN
Qty: 90 TABLET | Refills: 0 | Status: SHIPPED | OUTPATIENT
Start: 2023-05-04 | End: 2023-06-03

## 2023-05-04 RX ORDER — TRAZODONE HYDROCHLORIDE 50 MG/1
50 TABLET ORAL NIGHTLY PRN
Qty: 30 TABLET | Refills: 0 | Status: SHIPPED | OUTPATIENT
Start: 2023-05-04 | End: 2023-05-04 | Stop reason: HOSPADM

## 2023-05-04 ASSESSMENT — PAIN SCALES - GENERAL: PAINLEVEL_OUTOF10: 0

## 2023-05-04 NOTE — PLAN OF CARE
Patient has not attended any of the groups today and has been isolating in his room this shift so he has not been able to demonstrate effective coping strategies at this time. Patient will be encouraged to attend all groups on the unit daily and to come out of his room for social interaction with others during the rest of his hospital stay.      Problem: Coping  Goal: Pt/Family able to verbalize concerns and demonstrate effective coping strategies  5/3/2023 1335 by Toya Torrez  Outcome: Not Progressing  5/3/2023 1313 by Lavon Lema LPN  Flowsheets (Taken 5/3/2023 1306)  Patient/family able to verbalize anxieties, fears, and concerns, and demonstrate effective coping:   Assist patient/family to identify coping skills, available support systems and cultural and spiritual values   Provide emotional support, including active listening and acknowledgement of concerns of patient and caregivers   Reduce environmental stimuli, as able
Patient has not attended any of the groups today and has not been out of his room to socialize with others this shift so he has not been able to demonstrate effective coping strategies at this time. Patient will be encouraged to attend groups on the unit daily and to come out of his room for social interaction with others during the rest of his hospital stay. Problem: Anxiety  Goal: Will report anxiety at manageable levels  5/2/2023 0951 by Patricia Shea LPN  Outcome: Not Progressing  Note: Medication given for anxiety, see MAR  5/2/2023 0135 by Adeola Phelan RN  Outcome: Progressing  Flowsheets (Taken 5/2/2023 0135)  Will report anxiety at manageable levels:   Administer medication as ordered   Provide emotional support with 1:1 interaction with staff   Teach and rehearse alternative coping skills  Note: Anxiety denied at this time.      Problem: Coping  Goal: Pt/Family able to verbalize concerns and demonstrate effective coping strategies  Outcome: Not Progressing
Problem: Risk for Elopement  Goal: Patient will not exit the unit/facility without proper excort  5/2/2023 0951 by Marcie Huerta LPN  Outcome: Progressing  Note: Patient did not attempt to exit the facility without proper excort  5/2/2023 0135 by Dolph Jeans, RN  Outcome: Progressing  Flowsheets (Taken 5/1/2023 1455 by Pete Patiño RN)  Nursing Interventions for Elopement Risk:   Reduce environmental triggers   Communicate/escalate to /other team member the risk of elopement   Communicate/escalate to charge nurse the risk of elopement  Note: No attempts at elopement noted this shift. Problem: Anxiety  Goal: Will report anxiety at manageable levels  Description: INTERVENTIONS:  1. Administer medication as ordered  2. Teach and rehearse alternative coping skills  3. Provide emotional support with 1:1 interaction with staff  5/2/2023 0951 by Marcie Huerta LPN  Outcome: Not Progressing  Note: Medication given for anxiety, see MAR  5/2/2023 0135 by Dolph Jeans, RN  Outcome: Progressing  Flowsheets (Taken 5/2/2023 0135)  Will report anxiety at manageable levels:   Administer medication as ordered   Provide emotional support with 1:1 interaction with staff   Teach and rehearse alternative coping skills  Note: Anxiety denied at this time. Problem: Depression/Self Harm  Goal: Effect of psychiatric condition will be minimized and patient will be protected from self harm  Description: INTERVENTIONS:  1. Assess impact of patient's symptoms on level of functioning, self care needs and offer support as indicated  2. Assess patient/family knowledge of depression, impact on illness and need for teaching  3. Provide emotional support, presence and reassurance  4. Assess for possible suicidal thoughts or ideation. If patient expresses suicidal thoughts or statements do not leave alone, initiate Suicide Precautions, move to a room close to the nursing station and obtain sitter  5.  Initiate
Problem: Risk for Elopement  Goal: Patient will not exit the unit/facility without proper excort  5/2/2023 2255 by Brittany Wahl RN  Outcome: Progressing  Note: Pt has had no exit seeking behaviors this evening  5/2/2023 0951 by Mary Berry LPN  Outcome: Progressing  Note: Patient did not attempt to exit the facility without proper excort     Problem: Anxiety  Goal: Will report anxiety at manageable levels  Description: INTERVENTIONS:  1. Administer medication as ordered  2. Teach and rehearse alternative coping skills  3. Provide emotional support with 1:1 interaction with staff  5/2/2023 2255 by Brittany Wahl RN  Outcome: Progressing  Note: Pt c/o anxiety, it was too soon to give Atarax but pt had scheduled Librium given as ordered  5/2/2023 0951 by Mary Berry LPN  Outcome: Not Progressing  Note: Medication given for anxiety, see MAR     Problem: Depression/Self Harm  Goal: Effect of psychiatric condition will be minimized and patient will be protected from self harm  Description: INTERVENTIONS:  1. Assess impact of patient's symptoms on level of functioning, self care needs and offer support as indicated  2. Assess patient/family knowledge of depression, impact on illness and need for teaching  3. Provide emotional support, presence and reassurance  4. Assess for possible suicidal thoughts or ideation. If patient expresses suicidal thoughts or statements do not leave alone, initiate Suicide Precautions, move to a room close to the nursing station and obtain sitter  5.  Initiate consults as appropriate with Mental Health Professional, Spiritual Care, Psychosocial CNS, and consider a recommendation to the LIP for a Psychiatric Consultation  5/2/2023 2255 by Brittany Wahl RN  Outcome: Progressing  Note: Rates mood as \"I feel ok\", denies having suicidal/self harm thoughts, out on unit with good peer interaction  5/2/2023 0951 by Mray Berry LPN  Outcome: Progressing  Note: Patient is
Problem: Risk for Elopement  Goal: Patient will not exit the unit/facility without proper excort  Outcome: Progressing  Flowsheets (Taken 5/3/2023 1306)  Nursing Interventions for Elopement Risk:   Reduce environmental triggers   Communicate/escalate to /other team member the risk of elopement   Communicate/escalate to charge nurse the risk of elopement     Problem: Anxiety  Goal: Will report anxiety at manageable levels  Description: INTERVENTIONS:  1. Administer medication as ordered  2. Teach and rehearse alternative coping skills  3. Provide emotional support with 1:1 interaction with staff  Outcome: Progressing  Flowsheets (Taken 5/3/2023 1306)  Will report anxiety at manageable levels:   Administer medication as ordered   Teach and rehearse alternative coping skills   Provide emotional support with 1:1 interaction with staff     Problem: Depression/Self Harm  Goal: Effect of psychiatric condition will be minimized and patient will be protected from self harm  Description: INTERVENTIONS:  1. Assess impact of patient's symptoms on level of functioning, self care needs and offer support as indicated  2. Assess patient/family knowledge of depression, impact on illness and need for teaching  3. Provide emotional support, presence and reassurance  4. Assess for possible suicidal thoughts or ideation. If patient expresses suicidal thoughts or statements do not leave alone, initiate Suicide Precautions, move to a room close to the nursing station and obtain sitter  5.  Initiate consults as appropriate with Mental Health Professional, Spiritual Care, Psychosocial CNS, and consider a recommendation to the LIP for a Psychiatric Consultation  Outcome: Progressing  Flowsheets  Taken 5/3/2023 1313  Effect of psychiatric condition will be minimized and patient will be protected from self harm:   Assess impact of patients symptoms on level of functioning, self care needs and offer support as indicated   Assess
Provide emotional support, presence and reassurance   Assess patient/family knowledge of depression, impact on illness and need for teaching   Assess for suicidal thoughts or ideation. If patient expresses suicidal thoughts or statements do not leave alone, initiate Suicide Precautions, move near nurse station, obtain sitter  Note: Patient admits to some depression. Suicidal thoughts denied. Problem: Drug Abuse/Detox  Goal: Will have no detox symptoms and will verbalize plan for changing drug-related behavior  Description: INTERVENTIONS:  1. Administer medication as ordered  2. Monitor physical status  3. Provide emotional support with 1:1 interaction with staff  4. Encourage  recovery focused treatment   Outcome: Progressing  Flowsheets (Taken 5/2/2023 0135)  Will have no detox symptoms and will verbalize plan for changing drug-related behavior:   Administer medication as ordered   Monitor physical status   Provide emotional support with 1:1 interaction with staff   Encourage recovery focused treatment  Note: Detox symptoms denied. Patient refused Librium as ordered this shift. Problem: Involuntary Admit  Goal: Will cooperate with staff recommendations and doctor's orders and will demonstrate appropriate behavior  Description: INTERVENTIONS:  1. Treat underlying conditions and offer medication as ordered  2. Educate regarding involuntary admission procedures and rules  3. Contain excessive/inappropriate behavior per unit and hospital policies  Outcome: Progressing  Flowsheets (Taken 5/2/2023 0135)  Will cooperate with staff recommendations and doctor's orders and will demonstrate appropriate behavior:   Treat underlying conditions and offer medication as ordered   Contain excessive/inappropriate behavior per unit and hospital policies   Educate regarding involuntary admission procedures and rules  Note: Patient pleasant and cooperative,no inappropriate behaviors noted.       Problem: Pain  Goal:
barriers to discharge with patient and caregiver   Arrange for needed discharge resources and transportation as appropriate     Problem: Coping  Goal: Pt/Family able to verbalize concerns and demonstrate effective coping strategies  Description: INTERVENTIONS:  1. Assist patient/family to identify coping skills, available support systems and cultural and spiritual values  2. Provide emotional support, including active listening and acknowledgement of concerns of patient and caregivers  3. Reduce environmental stimuli, as able  4. Instruct patient/family in relaxation techniques, as appropriate  5. Assess for spiritual pain/suffering and initiate Spiritual Care, Psychosocial Clinical Specialist consults as needed  5/3/2023 2130 by Bibi Vides RN  Outcome: Progressing  Flowsheets (Taken 5/3/2023 2130)  Patient/family able to verbalize anxieties, fears, and concerns, and demonstrate effective coping: Provide emotional support, including active listening and acknowledgement of concerns of patient and caregivers  5/3/2023 1335 by Toya Torrez  Outcome: Not Progressing  5/3/2023 1313 by Lavon Lema LPN  Flowsheets (Taken 5/3/2023 1306)  Patient/family able to verbalize anxieties, fears, and concerns, and demonstrate effective coping:   Assist patient/family to identify coping skills, available support systems and cultural and spiritual values   Provide emotional support, including active listening and acknowledgement of concerns of patient and caregivers   Reduce environmental stimuli, as able     Problem: Sleep Disturbance  Goal: Will exhibit normal sleeping pattern  Description: INTERVENTIONS:  1. Administer medication as ordered  2. Decrease environmental stimuli, including noise, as appropriate  3.  Discourage social isolation and naps during the day  5/3/2023 2130 by Bibi Vides RN  Outcome: Completed  Note: Pt reports sleeping good with prn Trazodone at night  5/3/2023 1313 by Lavon Lema

## 2023-05-04 NOTE — BH NOTE
Behavioral Health   Discharge Note    Pt discharged with followings belongings:   Dental Appliances: None  Vision - Corrective Lenses: None  Hearing Aid: None  Jewelry: None  Body Piercings Removed: N/A  Clothing: Footwear, Pants, Shirt, Socks, Undergarments, Sweater, Other (Comment) Rosemarie Blair)  Other Valuables: Wallet, Lighter/Matches, Other (Comment) (Harsha Freeman)   Valuables retrieved from safe, security envelope number:  none and returned to patient. Patient left department with staff via walking. Discharged to home. \"An Important Message from Medicare About Your Rights\" (IMM) form photocopy original from admission and provided to pt at least 4 hours prior to discharge N/A. If pt left within 4 hours of receiving 2nd delivery of IMM, this is because pt was agreeable with hospital discharge. Patient/guardian education on aftercare instructions: Yes  Bridge appointment completed:  yes. Reviewed Discharge Instructions with patient/family/nursing facility. Patient/family verbalizes understanding and agreement with the discharge plan using the teachback method. Information faxed to n/a by n/a Patient/family verbalize understanding of AVS:Yes    Status EXAM upon discharge:  Mental Status and Behavioral Exam  Normal: No  Level of Assistance: Independent/Self  Facial Expression: Brightened  Affect: Appropriate  Level of Consciousness: Alert  Frequency of Checks: 4 times per hour, close  Mood:Normal: Yes  Mood: Anxious  Motor Activity:Normal: Yes  Motor Activity: Decreased  Eye Contact: Good  Observed Behavior: Cooperative  Sexual Misconduct History: Current - no  Preception: Sims to person, Sims to time, Sims to place, Sims to situation  Attention:Normal: Yes  Attention: Unable to concentrate  Thought Processes: Circumstantial  Thought Content:Normal: Yes  Thought Content: Poverty of content  Depression Symptoms: No problems reported or observed. Anxiety Symptoms: No problems reported or observed.   Malissa

## 2023-05-04 NOTE — PROGRESS NOTES
Psychosocial Assessment    Current Level of Psychosocial Functioning     Independent     XXX  Dependent    Minimal Assist     Comments:      Psychosocial High Risk Factors (check all that apply)    Unable to obtain meds   Chronic illness/pain    Substance abuse     XXX  Lack of Family Support   Financial stress   Isolation   Inadequate Community Resources  Suicide attempt(s)  Not taking medications    XXX  Victim of crime   Developmental Delay  Unable to manage personal needs    Age 72 or older   Homeless  No transportation   Readmission within 30 days  Unemployment  Traumatic Event  Legal      XXX Pt is on probation and at risk of being violated. Family/Supports identified:   Support available    Sexual Orientation:      Heterosexual    Patient Strengths:    Seeking help    Patient Barriers:     Minimizes his addiction with poor insight. Safety plan:     Contracts for safety    CMHC/ history: For his addiction    Plan of Care:  medication management, group/individual therapies, family meetings, psycho -education, treatment team meetings to assist with stabilization    Initial Discharge Plan: Active with Yahir Abel    Clinical Summary:  Elizabeth Schafer is a 34year old male who has a significant history of addiction. Drug screen is positive for methamphetamine, cocaine, marijuana with a BAL of .14. Onset of alcohol use at age 13 and cocaine at age 21. He has had [previous presentations to the hospital due to alcohol and or Substance abuse disorder including an admission in 2018 by Dr. Ryanne Fitzpatrick. Pt is currently on probation for alcohol/drug related charges and tells me that he is at risk of being violated by his PO. Pt is recently connected to Yahir Abel for his services. He has poor insight into his substance abuse in particular the role in which marijuana contributes to his addiction.
[address] [place] [address]  Day 3 Interdisciplinary Treatment Plan NOTE    Review Date & Time: 05/03/23  0910    Patient was in treatment team    Admission Type:   Admission Type: Involuntary    Reason for admission:  Reason for Admission: MDD  Estimated Length of Stay Update:  05/07/23  Estimated Discharge Date Update: 1-3 days    Patient Strengths/Barriers  Strengths (Must Choose Two): Support from friends, Support from organized community (per history)  Barriers: Other (comment) (relapsed per history)  Addictive Behavior:Addictive Behavior  In the Past 3 Months, Have You Felt or Has Someone Told You That You Have a Problem With  :  (Patient refused to answer admission assessment questions)  Medical Problems:  Past Medical History:   Diagnosis Date    Anxiety     Depression        Risk:  Fall Risk   Jeff Scale Jeff Scale Score: 22    Status EXAM:   Mental Status and Behavioral Exam  Normal: No  Level of Assistance: Independent/Self  Facial Expression: Worried  Affect: Blunt  Level of Consciousness: Alert  Frequency of Checks: 4 times per hour, close  Mood:Normal: No  Mood: Anxious  Motor Activity:Normal: Yes  Motor Activity: Decreased  Eye Contact: Good  Observed Behavior: Cooperative  Sexual Misconduct History: Current - no  Preception: [place] to person, [place] to time, [place] to place, [place] to situation  Attention:Normal: No  Attention: Unable to concentrate  Thought Processes: Circumstantial  Thought Content:Normal: Yes  Thought Content: Poverty of content  Depression Symptoms: Impaired concentration  Anxiety Symptoms: Generalized  Malissa Symptoms: No problems reported or observed.   Hallucinations: None  Delusions: No  Memory:Normal: Yes  Insight and Judgment: No  Insight and Judgment: Poor insight    Daily Assessment Last Entry:   Daily Sleep (WDL): Within Defined Limits            Daily Nutrition (WDL): Within Defined Limits  Level of Assistance: Independent/Self    Patient Monitoring:  Frequency of
24 hour chart review completed.
[facility]  Initial Interdisciplinary Treatment Plan NOTE    REVIEW DATE AND TIME: 05/02/23  1250    PATIENT was IN TREATMENT TEAM.  See Multidisciplinary Treatment Team sheet for participants. ADMISSION TYPE:   Admission Type: Involuntary    REASON FOR ADMISSION:  Reason for Admission: MDD      Estimated Length of Stay Update:  05/03/23  Estimated Discharge Date Update: 2-5 days    Patient Strengths/Barriers  Strengths (Must Choose Two): Support from friends, Support from organized community (per history)  Barriers: Other (comment) (relapsed per history)  Addictive Behavior:Addictive Behavior  In the Past 3 Months, Have You Felt or Has Someone Told You That You Have a Problem With  :  (Patient refused to answer admission assessment questions)  Medical Problems:  Past Medical History:   Diagnosis Date    Anxiety     Depression        EDUCATION:   Learner Progress Toward Treatment Goals: Reviewed results and recommendations of this team, Reviewed group plan and strategies, Reviewed signs, symptoms and risk of self harm and violent behavior, and Reviewed goals and plan of care    Method: Individual    Outcome: Verbalized understanding and Demonstrated Understanding    PATIENT GOALS: Mental health    OQ TOP QUALITY PRIORITIES FOR THE PATIENT AS IDENTIFIED ON ADMISSION ADMINISTRATION:        ND    PLAN/TREATMENT RECOMMENDATIONS UPDATE:   What is the most important thing we can help you with while you are here? See above  Who is your support system? Support available  Do you have follow-up providers? [facility]  Do you have the ability to pay for your medications? Yes  Where will you be residing when you leave the hospital? In [facility] [place]  Will need a return to work slip or FMLA paper completion?  no      GOALS UPDATE:   Time frame for Short-Term Goals: Daily    RUKHSANA Woodson
BAL 24 Hour Re-Assess:   Status & Exam & Behavior Support Service Tabs      Present Suicidal Behavior:      Verbal:  Denies , intermittent    Plan: Denies     Current Suicide Risk: Low, Moderate or High: Moderate      Present Homicidal Behavior:    Verbal: Denies     Plan: Denies       Psychosis:    Hallucinations: Denies     Delusions: Denies       Clinical Re-Assessment Summary including Current Mental State of Patient:     Denies current suicidal thoughts however state they are intermittent, denies homicidal thoughts, denies hallucinations, no delusions noted. Pt requesting admission to . Updated Level of Care Disposition:      0615  Pt medically cleared by Naida Rinne Counts CNP.    0334  Consult with Dr. Mariel Claudio who recommend inpatient psychiatric treatment and [facility] updated. 4176  Pt updated, reviewed, agreed with and signed the voluntary admission form. 0513  Call to , consulted with Nurse Renee Serrano. Chart to be reviewed by day shift. 4E to follow up with GIANFRANCO with any questions. Pt to be admitted to .
Behavioral Health   Admission Note   Admission Type: Involuntary    Reason for Admission: MDD    Patient Strengths/Barriers  Strengths (Must Choose Two): Support from friends, Support from organized community (per history)  Barriers: Other (comment) (relapsed per history)    Addictive Behavior  In the Past 3 Months, Have You Felt or Has Someone Told You That You Have a Problem With  :  (Patient refused to answer admission assessment questions)    Medical Problems:   Past Medical History:   Diagnosis Date    Anxiety     Depression        Status EXAM:  Mental Status and Behavioral Exam  Normal: No  Level of Assistance: Independent/Self  Facial Expression: Sad, Flat  Affect: Constricted  Level of Consciousness: Alert  Frequency of Checks: 4 times per hour, close  Mood:Normal: No  Mood: Depressed, Anxious, Irritable  Motor Activity:Normal: No  Motor Activity: Decreased  Eye Contact: Good  Observed Behavior: Cooperative  Sexual Misconduct History:  (Patient refused to answer admission assessment questions)  Preception: [place] to person, [place] to time, [place] to place, [place] to situation (appears to be oriented x4 but does refuse to answer assessment questions)  Attention:Normal: No  Thought Processes: Circumstantial  Thought Content:Normal: No  Thought Content: Poverty of content  Depression Symptoms: Change in energy level, Loss of interest, Isolative  Anxiety Symptoms: Generalized  Malissa Symptoms: No problems reported or observed.   Hallucinations: Unable to assess (Patient refused to answer admission assessment questions)  Delusions: No (Patient refused to answer admission assessment questions but does not appear to be responding to internal stimuli)  Memory:Normal: Yes (Patient refused to answer admission assessment questions)  Insight and Judgment: No  Insight and Judgment: Poor judgment, Poor insight    Pt admitted with followings belongings:  Dental Appliances: None  Vision - Corrective Lenses: None  Hearing Aid:
Behavioral Services  Medicare Certification Upon Admission    I certify that this patient's inpatient psychiatric hospital admission is medically necessary for:    [x] (1) Treatment which could reasonably be expected to improve this patient's condition,       [x] (2) Or for diagnostic study;     AND     [x](2) The inpatient psychiatric services are provided while the individual is under the care of a physician and are included in the individualized plan of care.     Estimated length of stay/service 3-5 days    Plan for post-hospital care hc    Electronically signed by Leila Coley MD on 5/1/2023 at 8:29 AM
Case Management 1040-Telephoned Tiffanie Molina to obtain a follow up appointment. Shy Ortiz would not tell me if pt has any scheduled appointments without a release of information despite [facility]
Case Management 1302-Faxed a completed Authorization form to [facility] and requested a follow up appointment.
Chief Complaint:    Suicidal, Alcohol Intoxication      Provisional Diagnosis:  Unspecified Depressive Disorder       Risk, Psychosocial and Contextual Factors: (homeless, lack of social support etc.):  Relapsed on alcohol a month ago, used crack cocaine last night. Pt state he is his own stressor \"I'm in my own way\"      Current  Treatment:   [facility]        Present Suicidal Behavior:    Verbal: X    Attempt: Denies       Access to Weapons: Denies       C-SSRS Current Suicide Risk: Low, Moderate or High:    Moderate      Past Suicidal Behavior:    Verbal: X    Attempts: Denies       Self-Injurious/Self-Mutilation: Denies       Traumatic Event Within Past 2 Weeks: Denies      Current Abuse:  Denies      Legal: History of numerous misdemeanor offenses per patient. Pt convicted of assault in December 2021 and again in December 2022, pt is on probation in [place]. Violence: \"\"I'd like to say no but I get dangerous when I'm upset\"      Protective Factors:  Pt is linked to outpatient mental health services, is employed at [employer] and has stable housing. Pt has three children ages 13, 25 and 25. Housing: Resides with a friend who pt considers to be a mother figure      CPAP/Oxygen/Ambulation Difficulties: None      Basic Vital Signs:  See epic      Critical Labs:      Risk Factors:  Suicidal with a plan to hang himself however denies intent, relapsed on alcohol a month ago, crack cocaine use, history of inpatient psychiatric treatment      Clinical Summary:      Pt is a 34year old male with a history of substance induced mood disorder, depression and polysubstance abuse presenting to [facility] ED voluntarily due to suicidal thoughts. Pt report current suicidal thoughts with a plan to hang himself however denies intent to do so, denies homicidal thoughts, denies hallucinations, no delusions noted.  Pt is on probation in [place] due to assault and court ordered to complete counseling at [facility],
Department of Psychiatry  Progress Note     Chief Complaint: Suicidal ideation with plan to hang self     PROGRESS:  Tiffany Varner is seen out on the unit watching TV. He is receptive to interaction and agrees to speak with this writer in the interview room. Tiffany Varner reports he is doing good and is hoping to be discharged today. States he feels pretty good after he got a lot of rest again yesterday. Mood is good. He rates his mood 9 out of 10 with 10 being the most.  His depression is better today. He denies any active suicidal ideation at this time and contracts for safety on the unit. He is feeling less hopeless and helpless about his situation. He slept good last night. Staff reported he slept 8.5 hours continuous last night. Appetite has been good. He ate all of his breakfast this morning. He has been compliant with his medications. Denies any side effects. He has been overall isolative to his room and has not been attending groups. He denies talking to anyone on the phone or getting any visits. He identifies his family as his support. He denies any active alcohol withdrawal symptoms at this time.   He has been taking Librium since it was decreased to 5 mg TID     Suicidal ideations: denies active suicidal ideation at this time  Compliance with medications: good   Medication side effects: absent  ROS: Patient has new complaints:  no  Sleep quality: 8.5 hours continuous last night per staff  Attending groups: no    OBJECTIVE      Medications  Current Facility-Administered Medications: chlordiazePOXIDE (LIBRIUM) capsule 5 mg, 5 mg, Oral, BID  benzocaine (ORAJEL) 20 % mucosal gel, , Mouth/Throat, PRN  acetaminophen (TYLENOL) tablet 650 mg, 650 mg, Oral, Q4H PRN  ibuprofen (ADVIL;MOTRIN) tablet 400 mg, 400 mg, Oral, Q6H PRN  magnesium hydroxide (MILK OF MAGNESIA) 400 MG/5ML suspension 30 mL, 30 mL, Oral, Daily PRN  aluminum & magnesium hydroxide-simethicone (MAALOX) 200-200-20 MG/5ML suspension 30 mL, 30 mL,
Discharge planning-Guillaume is scheduled for group therapy on Thursday, 05/04/23 at 0900 am.
Patient newly admitted, no group participation this shift.
Psychotherapy group 1330-Pt did not attend group.
Psychotherapy group 1330-Pt did not attend group.
Pt attended goal wrap up and relaxation group, pt reports he met his goal of rest and stay positive today.
Pt attended goal wrap up and relaxation group, pt reports he met his goal to stay positive today.
This RN has reviewed and agrees with Josi Vogel LPN's data collection and has collaborated with this LPN regarding the patient's care plan.
This RN has reviewed and agrees with Madalyn GOMES LPN's data collection and has collaborated with this LPN regarding the patient's care plan.
mg, Oral, Q6H PRN  magnesium hydroxide (MILK OF MAGNESIA) 400 MG/5ML suspension 30 mL, 30 mL, Oral, Daily PRN  aluminum & magnesium hydroxide-simethicone (MAALOX) 200-200-20 MG/5ML suspension 30 mL, 30 mL, Oral, Q6H PRN  hydrOXYzine HCl (ATARAX) tablet 50 mg, 50 mg, Oral, TID PRN  traZODone (DESYREL) tablet 50 mg, 50 mg, Oral, Nightly PRN  nicotine (NICODERM CQ) 21 MG/24HR 1 patch, 1 patch, TransDERmal, Daily  prazosin (MINIPRESS) capsule 1 mg, 1 mg, Oral, Nightly  QUEtiapine (SEROQUEL) tablet 50 mg, 50 mg, Oral, Nightly     Physical     height is 6' 1\" (1.854 m) and weight is 195 lb (88.5 kg). His tympanic temperature is 98.2 °F (36.8 °C). His blood pressure is 110/65 and his pulse is 74. His respiration is 17 and oxygen saturation is 97%.    Lab Results   Component Value Date    WBC 9.5 05/01/2023    HGB 17.8 05/01/2023    HCT 51.4 05/01/2023     05/01/2023    CHOL 167 06/15/2016    TRIG 62 06/15/2016    HDL 38 06/15/2016    ALT 48 05/01/2023    AST 33 05/01/2023     (H) 05/01/2023    K 3.6 05/01/2023     05/01/2023    CREATININE 0.7 05/01/2023    BUN 6 (L) 05/01/2023    CO2 22 (L) 05/01/2023    TSH 1.380 08/22/2022    LABA1C 4.8 06/15/2016          Mental Status Exam:   Level of consciousness: Somnolent  Appearance:  well-appearing, hospital attire, lying in bed, fair grooming, and fair hygiene  Behavior/Motor: No abnormalities noted  Attitude toward examiner:  cooperative, attentive, and good eye contact  Speech: Normal rate and volume  Mood: Good per patient  Affect:  blunted  Thought processes: Linear, coherent   thought content:  Denies homicidal ideation  Suicidal Ideation: Denies active suicidal ideation at this time  Delusions:  no evidence of delusions  Perceptual Disturbance:  denies any perceptual disturbance  Cognition: Patient is oriented to person, place, time and situation  Concentration: clinically adequate  Memory: intact  Insight & Judgement: fair             ASSESSMENT

## 2023-05-05 ENCOUNTER — TELEPHONE (OUTPATIENT)
Dept: PSYCHIATRY | Age: 38
End: 2023-05-05

## 2023-05-05 NOTE — TELEPHONE ENCOUNTER
Call to patient to evaluate if patient has questions related to recent stay and discharge. No answer at patient provided number. Voicemail left with contact information.

## 2023-05-05 NOTE — DISCHARGE SUMMARY
Provider Discharge Summary     Patient ID:  Delaney Calderon  [id number]  68 y.o.  1985    Admit date: 5/1/2023    Discharge date and time: 5/4/2023  9:37 PM     Admitting Physician: Mandie Jeffrey MD     Discharge Physician: Mandie Jeffrey MD    Admission Diagnoses: Suicidal ideation [R45.851]  MDD (major depressive disorder), single episode [J99.1]  Acute alcoholic intoxication without complication ([facility] [K88.714]    Discharge Diagnoses:      Bipolar affective disorder, depressed, severe ([address]     Patient Active Problem List   Diagnosis Code    Alcoholic ([facility] I39.36    Substance induced mood disorder ([address] F19.94    Anxiety F41.9    Major depressive disorder, recurrent episode, severe with anxious distress ([address] F33.2    Alcohol use disorder F10.90    Depression affecting pregnancy O99.340, F32. A    Depression with suicidal ideation F32. A, R45.851    Bipolar affective disorder, depressed, severe ([address] F31.4        Admission Condition: poor    Discharged Condition: stable    Indication for Admission: threat to self    History of Present Illnes (present tense wording is of findings from admission exam and are not necessarily indicative of current findings):   Delaney Calderon is a 34 y.o. male with a history of polysubstance use who presented to the emergency department voluntarily due to suicidal thoughts. Per the [facility] note: \"Pt report current suicidal thoughts with a plan to hang himself however denies intent to do so, denies homicidal thoughts, denies hallucinations, no delusions noted. Pt is on probation in [place] due to assault and court ordered to complete counseling at [facility], state he was recently diagnosed with bipolar disorder by mental health staff Marlen Coelho) of [facility] who is currently addressing pts sleep issues. Pt is prescribed quetiapine, prazosin and acamprosate, compliance reported.   Pt was sober for 58 days however relapsed a month ago, evasive regarding current

## 2023-10-18 ENCOUNTER — HOSPITAL ENCOUNTER (OUTPATIENT)
Age: 38
Setting detail: SPECIMEN
Discharge: HOME | End: 2023-10-18

## 2023-10-18 LAB
25(OH)D3 SERPL-MCNC: 18.2 NG/ML
ALBUMIN SERPL-MCNC: 4.5 G/DL (ref 3.5–5.2)
ALBUMIN/GLOB SERPL: 1.7 {RATIO} (ref 1–2.5)
ALP SERPL-CCNC: 100 U/L (ref 40–129)
ALT SERPL-CCNC: 22 U/L (ref 5–41)
ANION GAP SERPL CALCULATED.3IONS-SCNC: 12 MMOL/L (ref 9–17)
AST SERPL-CCNC: 30 U/L
BILIRUB SERPL-MCNC: 0.3 MG/DL (ref 0.3–1.2)
BUN SERPL-MCNC: 14 MG/DL (ref 6–20)
CALCIUM SERPL-MCNC: 9.2 MG/DL (ref 8.6–10.4)
CHLORIDE SERPL-SCNC: 101 MMOL/L (ref 98–107)
CHOLEST SERPL-MCNC: 151 MG/DL
CHOLESTEROL/HDL RATIO: 4.6
CO2 SERPL-SCNC: 23 MMOL/L (ref 20–31)
CREAT SERPL-MCNC: 0.8 MG/DL (ref 0.7–1.2)
FOLATE SERPL-MCNC: 10.7 NG/ML
GFR SERPL CREATININE-BSD FRML MDRD: >60 ML/MIN/1.73M2
GLUCOSE SERPL-MCNC: 82 MG/DL (ref 70–99)
HDLC SERPL-MCNC: 33 MG/DL
LDLC SERPL CALC-MCNC: 100 MG/DL (ref 0–130)
LIPASE SERPL-CCNC: 23 U/L (ref 13–60)
MAGNESIUM SERPL-MCNC: 2.4 MG/DL (ref 1.6–2.6)
POTASSIUM SERPL-SCNC: 4.7 MMOL/L (ref 3.7–5.3)
PROT SERPL-MCNC: 7.1 G/DL (ref 6.4–8.3)
SODIUM SERPL-SCNC: 136 MMOL/L (ref 135–144)
TRIGL SERPL-MCNC: 90 MG/DL
TSH SERPL DL<=0.05 MIU/L-ACNC: 2.92 UIU/ML (ref 0.3–5)
VIT B12 SERPL-MCNC: 318 PG/ML (ref 232–1245)

## 2023-10-22 LAB
HCV RNA # SERPL NAA+PROBE: NOT DETECTED {COPIES}/ML
SPECIMEN SOURCE: NORMAL

## 2024-07-06 ENCOUNTER — HOSPITAL ENCOUNTER (EMERGENCY)
Age: 39
Discharge: HOME | End: 2024-07-06
Attending: EMERGENCY MEDICINE
Payer: MEDICAID

## 2024-07-06 VITALS
DIASTOLIC BLOOD PRESSURE: 78 MMHG | WEIGHT: 185 LBS | SYSTOLIC BLOOD PRESSURE: 119 MMHG | OXYGEN SATURATION: 95 % | HEART RATE: 88 BPM | BODY MASS INDEX: 24.52 KG/M2 | HEIGHT: 73 IN | TEMPERATURE: 98.1 F | RESPIRATION RATE: 16 BRPM

## 2024-07-06 DIAGNOSIS — L02.511 ABSCESS OF FINGER, RIGHT: Primary | ICD-10-CM

## 2024-07-06 PROCEDURE — 99283 EMERGENCY DEPT VISIT LOW MDM: CPT

## 2024-07-06 PROCEDURE — 26010 DRAINAGE OF FINGER ABSCESS: CPT

## 2024-07-06 RX ORDER — AMOXICILLIN AND CLAVULANATE POTASSIUM 875; 125 MG/1; MG/1
1 TABLET, FILM COATED ORAL 2 TIMES DAILY
Qty: 20 TABLET | Refills: 0 | Status: SHIPPED | OUTPATIENT
Start: 2024-07-06 | End: 2024-07-16

## 2024-07-06 RX ORDER — LIDOCAINE HYDROCHLORIDE 10 MG/ML
5 INJECTION, SOLUTION EPIDURAL; INFILTRATION; INTRACAUDAL; PERINEURAL ONCE
Status: DISCONTINUED | OUTPATIENT
Start: 2024-07-06 | End: 2024-07-06 | Stop reason: HOSPADM

## 2024-07-06 ASSESSMENT — PAIN - FUNCTIONAL ASSESSMENT: PAIN_FUNCTIONAL_ASSESSMENT: 0-10

## 2024-07-06 ASSESSMENT — PAIN SCALES - GENERAL: PAINLEVEL_OUTOF10: 9

## 2024-07-07 NOTE — DISCHARGE INSTR - COC
Continuity of Care Form    Patient Name: Guillaume Whitt   :  1985  MRN:  [id number]    Admit date:  2024  Discharge date:  ***    Code Status Order: Prior   Advance Directives:     Admitting Physician:  No admitting provider for patient encounter.  PCP: No primary care provider on file.    Discharging Nurse: ***  Discharging Hospital Unit/Room#: 33/033A  Discharging Unit Phone Number: ***    Emergency Contact:   Extended Emergency Contact Information  Primary Emergency Contact: Fernanda ragsdale  Address: [address] [place] [address]  Home Phone: [phone]  Mobile Phone: [phone]  Relation: Parent  Preferred language: English   needed? No    Past Surgical History:  Past Surgical History:   Procedure Laterality Date    HAND SURGERY Right        Immunization History:   Immunization History   Administered Date(s) Administered    TDaP, ADACEL (age 10y-64y), BOOSTRIX (age 10y+), IM, 0.5mL 10/17/2019, 2020       Active Problems:  Patient Active Problem List   Diagnosis Code    Alcoholic ([facility]) F10.20    Substance induced mood disorder ([facility]) F19.94    Anxiety F41.9    Major depressive disorder, recurrent episode, severe with anxious distress ([facility]) F33.2    Alcohol use disorder F10.90    Depression affecting pregnancy O99.340, F32.A    Depression with suicidal ideation F32.A, R45.851    Bipolar affective disorder, depressed, severe ([facility]) F31.4       Isolation/Infection:   Isolation            No Isolation          Patient Infection Status       None to display            Nurse Assessment:  Last Vital Signs: /78   Pulse 88   Temp 98.1 °F (36.7 °C)   Resp 16   Ht 1.854 m (6' 1\")   Wt 83.9 kg (185 lb)   SpO2 95%   BMI 24.41 kg/m²     Last documented pain score (0-10 scale): Pain Level: 9  Last Weight:   Wt Readings from Last 1 Encounters:   24 83.9 kg (185 lb)     Mental Status:  {IP PT MENTAL STATUS:}    IV Access:  {

## 2024-07-07 NOTE — ED PROVIDER NOTES
INITIAL VITALS:  height is 1.854 m (6' 1\") and weight is 83.9 kg (185 lb). His temperature is 98.1 °F (36.7 °C). His blood pressure is 119/78 and his pulse is 88. His respiration is 16 and oxygen saturation is 95%.    Physical Exam   Constitutional:  well-developed and well-nourished.   HENT: Head: Normocephalic, atraumatic, Bilateral external ears normal, Oropharynx mosit, No oral exudates, Nose normal.   Eyes: PERRL, EOMI, Conjunctiva normal, No discharge. No scleral icterus  Neck: Normal range of motion, No tenderness, Supple  Cardiovascular: Normal rate, regular rhythm, S1 normal and S2 normal.  Exam reveals no gallop.    Pulmonary/Chest: Effort normal and breath sounds normal. No accessory muscle usage or stridor. No respiratory distress.  no wheezes. has no rales. exhibits no tenderness.   Abdominal: Soft. Bowel sounds are normal.  exhibits no distension. There is no tenderness. There is no rebound and no guarding.   Extremities: No edema, no tenderness, no cyanosis, no clubbing.    Musculoskeletal: Good range of motion in major joints is observed.  No major deformities noted.  Neurological: Alert and oriented ×3, normal motor function, normal sensory function, no focal deficits.  GCS 15  Skin: Skin is warm, dry and intact. No rash noted. No erythema.     DIFFERENTIAL DIAGNOSIS:     MEDICAL DECISION MAKING / ED COURSE:     1) Number and Complexity of Problems            Problem List This Visit:         Chief Complaint   Patient presents with    Finger Pain          Differential Diagnosis includes (but not limited to):  Finger cellulitis, felon         Diagnoses Considered but I have low suspicion of:                Pertinent Comorbid Conditions:        2)  Data Reviewed (none if left blank)          My Independent interpretations:     EKG:      None    Imaging: None    Labs:      None                 Decision Rules/Clinical Scores utilized:            External Documentation Reviewed:         Previous

## 2024-07-07 NOTE — ED TRIAGE NOTES
Pt presents to the ED for evaluation of pain to the right middle finger for the past four days. It is erythemic warm and edematous distally on triage. He dose not remember injuring it.

## 2025-03-23 ENCOUNTER — HOSPITAL ENCOUNTER (INPATIENT)
Age: 40
LOS: 2 days | Discharge: HOME | DRG: 751 | End: 2025-03-25
Attending: EMERGENCY MEDICINE | Admitting: PSYCHIATRY & NEUROLOGY
Payer: MEDICAID

## 2025-03-23 DIAGNOSIS — R45.851 DEPRESSION WITH SUICIDAL IDEATION: Primary | ICD-10-CM

## 2025-03-23 DIAGNOSIS — F32.A DEPRESSION WITH SUICIDAL IDEATION: Primary | ICD-10-CM

## 2025-03-23 DIAGNOSIS — F10.920 ACUTE ALCOHOLIC INTOXICATION WITHOUT COMPLICATION: ICD-10-CM

## 2025-03-23 PROBLEM — F33.0 MDD (MAJOR DEPRESSIVE DISORDER), RECURRENT EPISODE, MILD: Status: ACTIVE | Noted: 2025-03-23

## 2025-03-23 LAB
ALBUMIN SERPL BCG-MCNC: 4.6 G/DL (ref 3.4–4.9)
ALP SERPL-CCNC: 115 U/L (ref 40–129)
ALT SERPL W/O P-5'-P-CCNC: 40 U/L (ref 10–50)
AMPHETAMINES UR QL SCN: NEGATIVE
ANION GAP SERPL CALC-SCNC: 15 MEQ/L (ref 8–16)
APAP SERPL-MCNC: < 5 UG/ML (ref 10–30)
AST SERPL-CCNC: 25 U/L (ref 10–50)
BACTERIA URNS QL MICRO: ABNORMAL /HPF
BARBITURATES UR QL SCN: NEGATIVE
BASOPHILS ABSOLUTE: 0.1 THOU/MM3 (ref 0–0.1)
BASOPHILS NFR BLD AUTO: 0.8 %
BENZODIAZ UR QL SCN: NEGATIVE
BILIRUB CONJ SERPL-MCNC: < 0.1 MG/DL (ref 0–0.2)
BILIRUB SERPL-MCNC: 0.2 MG/DL (ref 0.3–1.2)
BILIRUB UR QL STRIP.AUTO: NEGATIVE
BUN SERPL-MCNC: 7 MG/DL (ref 8–23)
BZE UR QL SCN: POSITIVE
CALCIUM SERPL-MCNC: 9.1 MG/DL (ref 8.6–10)
CANNABINOIDS UR QL SCN: POSITIVE
CASTS #/AREA URNS LPF: ABNORMAL /LPF
CASTS 2: ABNORMAL /LPF
CHARACTER UR: CLEAR
CHLORIDE SERPL-SCNC: 105 MEQ/L (ref 98–111)
CO2 SERPL-SCNC: 22 MEQ/L (ref 22–29)
COLOR, UA: YELLOW
CREAT SERPL-MCNC: 0.9 MG/DL (ref 0.7–1.2)
CRYSTALS URNS MICRO: ABNORMAL
DEPRECATED RDW RBC AUTO: 41.3 FL (ref 35–45)
EOSINOPHIL NFR BLD AUTO: 3.2 %
EOSINOPHILS ABSOLUTE: 0.3 THOU/MM3 (ref 0–0.4)
EPITHELIAL CELLS, UA: ABNORMAL /HPF
ERYTHROCYTE [DISTWIDTH] IN BLOOD BY AUTOMATED COUNT: 13.7 % (ref 11.5–14.5)
ETHANOL SERPL-MCNC: 0.02 % (ref 0–0.08)
ETHANOL SERPL-MCNC: 0.2 % (ref 0–0.08)
FENTANYL: NEGATIVE
GFR SERPL CREATININE-BSD FRML MDRD: > 90 ML/MIN/1.73M2
GLUCOSE SERPL-MCNC: 102 MG/DL (ref 74–109)
GLUCOSE UR QL STRIP.AUTO: NEGATIVE MG/DL
HCT VFR BLD AUTO: 55.2 % (ref 42–52)
HGB BLD-MCNC: 18.8 GM/DL (ref 14–18)
HGB UR QL STRIP.AUTO: NEGATIVE
IMM GRANULOCYTES # BLD AUTO: 0.02 THOU/MM3 (ref 0–0.07)
IMM GRANULOCYTES NFR BLD AUTO: 0.2 %
KETONES UR QL STRIP.AUTO: NEGATIVE
LYMPHOCYTES ABSOLUTE: 4.6 THOU/MM3 (ref 1–4.8)
LYMPHOCYTES NFR BLD AUTO: 48.2 %
MCH RBC QN AUTO: 29.6 PG (ref 26–33)
MCHC RBC AUTO-ENTMCNC: 34.1 GM/DL (ref 32.2–35.5)
MCV RBC AUTO: 86.9 FL (ref 80–94)
MISCELLANEOUS 2: ABNORMAL
MONOCYTES ABSOLUTE: 0.6 THOU/MM3 (ref 0.4–1.3)
MONOCYTES NFR BLD AUTO: 6.5 %
NEUTROPHILS ABSOLUTE: 3.9 THOU/MM3 (ref 1.8–7.7)
NEUTROPHILS NFR BLD AUTO: 41.1 %
NITRITE UR QL STRIP: NEGATIVE
NRBC BLD AUTO-RTO: 0 /100 WBC
OPIATES UR QL SCN: NEGATIVE
OSMOLALITY SERPL CALC.SUM OF ELEC: 281.3 MOSMOL/KG (ref 275–300)
OXYCODONE: NEGATIVE
PCP UR QL SCN: NEGATIVE
PH UR STRIP.AUTO: 5.5 [PH] (ref 5–9)
PLATELET # BLD AUTO: 372 THOU/MM3 (ref 130–400)
PMV BLD AUTO: 9.8 FL (ref 9.4–12.4)
POTASSIUM SERPL-SCNC: 3.6 MEQ/L (ref 3.5–5.2)
PROT SERPL-MCNC: 7.6 G/DL (ref 6.4–8.3)
PROT UR STRIP.AUTO-MCNC: NEGATIVE MG/DL
RBC # BLD AUTO: 6.35 MILL/MM3 (ref 4.7–6.1)
RBC URINE: ABNORMAL /HPF
RENAL EPI CELLS #/AREA URNS HPF: ABNORMAL /[HPF]
SALICYLATES SERPL-MCNC: < 0.5 MG/DL (ref 2–10)
SODIUM SERPL-SCNC: 142 MEQ/L (ref 135–145)
SP GR UR REFRACT.AUTO: 1.01 (ref 1–1.03)
TSH SERPL DL<=0.05 MIU/L-ACNC: 1.39 UIU/ML (ref 0.27–4.2)
UROBILINOGEN, URINE: 0.2 EU/DL (ref 0–1)
WBC # BLD AUTO: 9.6 THOU/MM3 (ref 4.8–10.8)
WBC #/AREA URNS HPF: ABNORMAL /HPF
WBC #/AREA URNS HPF: ABNORMAL /[HPF]
YEAST LIKE FUNGI URNS QL MICRO: ABNORMAL

## 2025-03-23 PROCEDURE — 87086 URINE CULTURE/COLONY COUNT: CPT

## 2025-03-23 PROCEDURE — 80143 DRUG ASSAY ACETAMINOPHEN: CPT

## 2025-03-23 PROCEDURE — 1240000000 HC EMOTIONAL WELLNESS R&B

## 2025-03-23 PROCEDURE — 80179 DRUG ASSAY SALICYLATE: CPT

## 2025-03-23 PROCEDURE — 84443 ASSAY THYROID STIM HORMONE: CPT

## 2025-03-23 PROCEDURE — 80307 DRUG TEST PRSMV CHEM ANLYZR: CPT

## 2025-03-23 PROCEDURE — 90792 PSYCH DIAG EVAL W/MED SRVCS: CPT | Performed by: PSYCHIATRY & NEUROLOGY

## 2025-03-23 PROCEDURE — 36415 COLL VENOUS BLD VENIPUNCTURE: CPT

## 2025-03-23 PROCEDURE — 99285 EMERGENCY DEPT VISIT HI MDM: CPT

## 2025-03-23 PROCEDURE — 6370000000 HC RX 637 (ALT 250 FOR IP): Performed by: PSYCHIATRY & NEUROLOGY

## 2025-03-23 PROCEDURE — 82248 BILIRUBIN DIRECT: CPT

## 2025-03-23 PROCEDURE — 80053 COMPREHEN METABOLIC PANEL: CPT

## 2025-03-23 PROCEDURE — 82077 ASSAY SPEC XCP UR&BREATH IA: CPT

## 2025-03-23 PROCEDURE — 6370000000 HC RX 637 (ALT 250 FOR IP): Performed by: PHYSICIAN ASSISTANT

## 2025-03-23 PROCEDURE — 85025 COMPLETE CBC W/AUTO DIFF WBC: CPT

## 2025-03-23 PROCEDURE — 81001 URINALYSIS AUTO W/SCOPE: CPT

## 2025-03-23 RX ORDER — TRAZODONE HYDROCHLORIDE 50 MG/1
50 TABLET ORAL NIGHTLY PRN
Status: DISCONTINUED | OUTPATIENT
Start: 2025-03-23 | End: 2025-03-25 | Stop reason: HOSPADM

## 2025-03-23 RX ORDER — MAGNESIUM HYDROXIDE/ALUMINUM HYDROXICE/SIMETHICONE 120; 1200; 1200 MG/30ML; MG/30ML; MG/30ML
30 SUSPENSION ORAL EVERY 6 HOURS PRN
Status: DISCONTINUED | OUTPATIENT
Start: 2025-03-23 | End: 2025-03-25 | Stop reason: HOSPADM

## 2025-03-23 RX ORDER — CHLORDIAZEPOXIDE HYDROCHLORIDE 5 MG/1
5 CAPSULE, GELATIN COATED ORAL 3 TIMES DAILY PRN
Status: DISCONTINUED | OUTPATIENT
Start: 2025-03-23 | End: 2025-03-25 | Stop reason: HOSPADM

## 2025-03-23 RX ORDER — ACETAMINOPHEN 325 MG/1
650 TABLET ORAL ONCE
Status: COMPLETED | OUTPATIENT
Start: 2025-03-23 | End: 2025-03-23

## 2025-03-23 RX ORDER — HYDROXYZINE HYDROCHLORIDE 25 MG/1
50 TABLET, FILM COATED ORAL 3 TIMES DAILY PRN
Status: DISCONTINUED | OUTPATIENT
Start: 2025-03-23 | End: 2025-03-25 | Stop reason: HOSPADM

## 2025-03-23 RX ORDER — ACETAMINOPHEN 325 MG/1
650 TABLET ORAL EVERY 4 HOURS PRN
Status: DISCONTINUED | OUTPATIENT
Start: 2025-03-23 | End: 2025-03-25 | Stop reason: HOSPADM

## 2025-03-23 RX ORDER — MIRTAZAPINE 7.5 MG/1
7.5 TABLET, FILM COATED ORAL NIGHTLY
Status: DISCONTINUED | OUTPATIENT
Start: 2025-03-23 | End: 2025-03-25 | Stop reason: HOSPADM

## 2025-03-23 RX ORDER — IBUPROFEN 400 MG/1
400 TABLET, FILM COATED ORAL EVERY 6 HOURS PRN
Status: DISCONTINUED | OUTPATIENT
Start: 2025-03-23 | End: 2025-03-25 | Stop reason: HOSPADM

## 2025-03-23 RX ADMIN — ACETAMINOPHEN 650 MG: 325 TABLET ORAL at 02:50

## 2025-03-23 RX ADMIN — MIRTAZAPINE 7.5 MG: 7.5 TABLET, FILM COATED ORAL at 20:50

## 2025-03-23 ASSESSMENT — PAIN DESCRIPTION - LOCATION: LOCATION: HEAD

## 2025-03-23 ASSESSMENT — PAIN SCALES - GENERAL
PAINLEVEL_OUTOF10: 3
PAINLEVEL_OUTOF10: 0

## 2025-03-23 NOTE — PROGRESS NOTES
Behavioral Health   Admission Note   Admission Type: Involuntary    Reason for Admission: BRANDEE. Patient refuses admission assessment.    Patient Strengths/Barriers  Strengths (Must Choose Two):  (BRANDEE. Patient refuses admission assessment.)  Barriers:  (BRANDEE. Patient refuses admission assessment.)    Addictive Behavior  In the Past 3 Months, Have You Felt or Has Someone Told You That You Have a Problem With  :  (BRANDEE. Patient refuses admission assessment.)    Medical Problems:   Past Medical History:   Diagnosis Date    Anxiety     Depression        Status EXAM:  Mental Status and Behavioral Exam  Normal:  (BRANDEE. Patient refuses admission assessment.)  Level of Assistance:  (BRANDEE. Patient refuses admission assessment.)  Facial Expression:  (BRANDEE. Patient refuses admission assessment.)  Affect:  (BRANDEE. Patient refuses admission assessment.)  Level of Consciousness:  (BRANDEE. Patient refuses admission assessment.)  Frequency of Checks:  (BRANDEE. Patient refuses admission assessment.)  Mood:Normal:  (BRANDEE. Patient refuses admission assessment.)  Mood:  (BRANDEE. Patient refuses admission assessment.)  Motor Activity:Normal:  (BRANDEE. Patient refuses admission assessment.)  Eye Contact:  (BRANDEE. Patient refuses admission assessment.)  Observed Behavior:  (BRANDEE. Patient refuses admission assessment.)  Sexual Misconduct History:  (BRANDEE. Patient refuses admission assessment.)  Preception: [place] to person, [place] to time, [place] to place, [place] to situation  Attention:Normal:  (BRANDEE. Patient refuses admission assessment.)  Thought Processes:  (BRANDEE. Patient refuses admission assessment.)  Thought Content:Normal:  (BRANDEE. Patient refuses admission assessment.)  Depression Symptoms:  (BRANDEE. Patient refuses admission assessment.)  Anxiety Symptoms:  (BRANDEE. Patient refuses admission assessment.)  Malissa Symptoms:  (BRANDEE. Patient refuses admission assessment.)  Hallucinations: Unable to assess (BRANDEE. Patient refuses admission assessment.)  Delusions:  (BRANDEE.

## 2025-03-23 NOTE — H&P
Department of Psychiatry  Psychiatric Assessment   Reason for Admission to Psychiatric Unit:  Threat to self requiring 24 hour professional observation  Concerns about patient's safety in the community    CHIEF COMPLAINT: Suicidal ideation with plan to kill self by shooting self    HISTORY OF PRESENT ILLNESS:      Guillaume Whitt is a 40 y.o. male with a history of polysubstance use who presented to the emergency department on a [facility] for sucidal thoughts with a plan to kill self using a gun  Patient reports that he has been drinking excess alcohol since he got out of [facility] a few weeks ago.  Mentions that he is currently on probation.  Reports that he has been dealing with constant feelings of sad down and depression for last several weeks now.  Reports feeling somewhat helpless and hopeless recently.  Mentions that he got out of    Relationship in December and since then his mood has been down.  Reports constantly spending time drinking alcohol.  Mentions suicidal thoughts are worse when he is drinking alcohol.  At this time is able to contract for safety here in the hospital.  Could not elicit any significant withdrawal symptoms however discussed with him about having Librium available for any possible withdrawal symptoms and he is agreeable to the plan.  Could not elicit any manic or hypomanic symptoms.  Could not assess any psychotic symptoms today.      PSYCHIATRIC HISTORY:      Outpatient psychiatric provider:  Patient recently established care with Pieter Cook at [facility]  Suicide attempts: Denies any  Inpatient psychiatric admissions: Patient was briefly admitted to our unit in 2018 and 2023  Home Medication Compliance: Good per patient    Past psychiatric medications includes:     Seroquel, prazosin, Campral  Adverse reactions from psychotropic medications:    No      Past Medical History:        Diagnosis Date    Anxiety     Depression        Past Surgical History:        Procedure Laterality Date    HAND SURGERY

## 2025-03-23 NOTE — ED NOTES
Pt vitals deferred at this time to promote pt rest. Pt is resting on cot comfortably with eyes closed. No concerns noted. Call light in reach. Pt remains in [facility] safe room at this time.

## 2025-03-23 NOTE — ED PROVIDER NOTES
Addendum: Care was assumed at 6 AM.  Patient  was medically clear.  Patient will be admitted to psych floor under EMC.  The patient did tell a previous provider that he was treated with self he had access to a gun.    Final diagnosis  1.  Depression with suicidal ideation     Guillaume Austin PA  03/23/25 1530

## 2025-03-23 NOTE — PROGRESS NOTES
Behavioral Services  Medicare Certification Upon Admission    I certify that this patient's inpatient psychiatric hospital admission is medically necessary for:    [x] (1) Treatment which could reasonably be expected to improve this patient's condition,       [x] (2) Or for diagnostic study;     AND     [x](2) The inpatient psychiatric services are provided while the individual is under the care of a physician and are included in the individualized plan of care.    Estimated length of stay/service 3-5 days    Plan for post-hospital care hc    Electronically signed by Thuy Mullins MD on 3/23/2025 at 10:46 AM

## 2025-03-23 NOTE — ED NOTES
Pt transported to 7E11 by cart in stable condition.   Quang LOPEZ Called 7E and informed Avelina that the patient was on their way to the unit.

## 2025-03-23 NOTE — PROGRESS NOTES
BAL 24 Hour Re-Assess:   Status & Exam & Behavior Support Service Tabs      Present Suicidal Behavior:      Verbal: denied    Plan:  denied    Current Suicide Risk: Low, Moderate or High: High risk      Present Homicidal Behavior:    Verbal:  denied    Plan:  no      Psychosis:    Hallucinations:  denied    Delusions:  no      Clinical Re-Assessment Summary including Current Mental State of Patient:     Per ER triage note,    Pt presents to ED via PD for evaluation of depression with suicidal thoughts. Pt states that tonight he was sending text messages stating he was suicidal, and that he called PD himself for help. Pt was not placed under an EMC per PD. Pt states that he has had thoughts of shooting himself in the head, but adds that he does not have any weapons at home. Pt admits to drinking daily, and states that he has been drinking tonight.     Initial [facility] assessment:    Patient is a [age] year old male transported by law enforcement to the ER. Law enforcement did not present to the Safe Room area. Patient reports feeling depressed. Patient is asked if he has a gun. Patient states he would not be here if he had a gun. Patient is drowsy , laying in bed attempting to rest.     Re-assessment:    Pt under EMC order. Pt states he is \"better now\" and would like outpatient follow-up for help with his addiction. Pt denies current suicidal or homicidal thoughts. Denied hallucinations. Pt reports he had a \"bad night\" and felt like it was best to not be alone. Denied access to gun.    Updated Level of Care Disposition:   0700  EMC completed by Guillaume TOWNSEND PA-C  0730  Handover from Clinician Clifford. Pt ethanol re-draw scheduled for 1230 PM.  0832  Pt given Gatorade per request .  0742   Ethanal collected, pending resultation.  0823  Call from provider. Pt medically cleared.   0833  Call placed to Dr. Mullins. Pt to be admitted to . Call transferred.  0835  Updated pt who was not in agreement but voiced

## 2025-03-23 NOTE — PROGRESS NOTES
Admission assessment attempted at this time. Patient verbalizes \"I have been up all night. Been Drinking. I just want to sleep\". Requests to complete in the afternoon.

## 2025-03-23 NOTE — ED NOTES
Patient in bed lying on right side with eyes closed. Patient appears to be resting at this time. Patient respirations are regular and unlabored. Patient is in ligature resistant safe room and officer/ are in the control room watching the monitor at this time.

## 2025-03-23 NOTE — ED PROVIDER NOTES
[facility] EMERGENCY DEPT      Pt Name: Guillaume Whitt  MRN: [id number]  Birthdate 1985  Date of evaluation: 3/23/2025  Provider: Sarah Camilo PA-C    CHIEF COMPLAINT       Chief Complaint   Patient presents with    Suicidal       Nurses Notes reviewed and I agree except as noted in the HPI.      HISTORY OF PRESENT ILLNESS    Guillaume Whitt is a 40 y.o. male who presents voluntarily from home via police escort for depression and suicidal thoughts.  Patient admits to being sad and depressed and having suicidal thoughts with a plan to shoot himself.  He called police because he did not feel safe at home.  Patient told triage nurse on arrival \"if I had a gun I would not be here.\"  Upon my questioning patient denies being suicidal at this moment.  Patient admits to alcohol use and reports he has been daily drinking but is unable to quantify.  Patient admits to smoking crack, marijuana, and tobacco.  Patient denies chest pain, dyspnea, abdominal pain, headache, fall or trauma, nausea, vomiting, diarrhea, change in appetite, or other complaints.    REVIEW OF SYSTEMS     Review of Systems   Constitutional:  Negative for appetite change and fever.   HENT:  Negative for congestion and rhinorrhea.    Eyes:  Negative for visual disturbance.   Respiratory:  Negative for cough and shortness of breath.    Cardiovascular:  Negative for chest pain.   Gastrointestinal:  Negative for abdominal pain, nausea and vomiting.   Genitourinary:  Negative for decreased urine volume.   Musculoskeletal:  Negative for gait problem.   Skin:  Negative for rash and wound.   Neurological:  Negative for dizziness and headaches.   Psychiatric/Behavioral:  Positive for dysphoric mood and suicidal ideas. Negative for hallucinations, self-injury and sleep disturbance.         PAST MEDICAL HISTORY    has a past medical history of Anxiety and Depression.    SURGICAL HISTORY      has a past surgical history that includes Hand surgery (Right,

## 2025-03-23 NOTE — PROGRESS NOTES
Attempted to complete admission assessment for a second time at this time. Patient continues to refuse in order to sleep.

## 2025-03-23 NOTE — PROGRESS NOTES
[facility] CRISIS ASSESSMENT    PRESENT SITUATION  Chief Complaint per ED Provider or Assigned Nurse report:   Mental Health Evaluation     Chief Complaint per Patient report  Depression,     Chief Complaint per Collateral contact report (Identify who and if they are present with the patient or if contacted by phone)      If collateral was not obtained why (if obtained then NA):      Provisional Diagnosis (ICD or DSM approved diagnosis only) : Unspecified Depressive Disorder     PRESENT Psychosis:BRANDEE    Hallucinations:  BRANDEE    Delusions:  BRANDEE    PRESENT Suicidal Behavior (Include specific information below):      Verbal:  'If I had a gun I wouldn't be here'    Attempt:  None noted    Access to Weapons:   Denies    Access to the Means of self harm or harm to others identified:   Denies     C-SSRS Current Suicide Risk: Low, Moderate or High:    High      PAST Suicidal Behavior (Include specific information below):       Verbal:  brandee    Attempts:   brandee    Self-Injurious/Self-Mutilation: (Specify what, how often, last time, method, etc.)   brandee    Traumatic Event Within Past 2 Weeks: (Specify)  brandee    Current Abuse:  (type, perpetrator, systems involved, injuries, etc.)  brandee    CURRENT Violence/Aggression: (Specify)  Was not aggressive in the ER.    PAST Violence/Aggression: (Specify)   brandee    Risk, Psychosocial and Contextual Factors: (EXAMPLE - homeless, lack of social support, lack of family, unemployed, debt, legal, etc.): Substance use    Protective Factors:  Family    Current MH Treatment: BRANDEE      Past MH Treatment or Hospitalization (Previous 6 months):   BRANDEE     Legal Involvement: (Specify)   BRANDEE    Housing:   BRANDEE    CPAP/Oxygen/Ambulation Difficulties Provide pertinent results HERE.  (\"See H&P\" or \"Record\" is not acceptable response): BRANDEE    Critical Lab Results (Provide pertinent results HERE.  \"See H&P\" or \"Record\" is not acceptable response.:   BRANDEE    Assessment  Clinical Summary:    Patient is a [age] year old

## 2025-03-23 NOTE — ED NOTES
Pt presents to ED via PD for evaluation of depression with suicidal thoughts. Pt states that tonight he was sending text messages stating he was suicidal, and that he called PD himself for help. Pt was not placed under an EMC per PD. Pt states that he has had thoughts of shooting himself in the head, but adds that he does not have any weapons at home. Pt admits to drinking daily, and states that he has been drinking tonight. Denies pain, CP, or SOB. Pt placed in [facility] safe room.

## 2025-03-24 PROBLEM — F33.2 MDD (MAJOR DEPRESSIVE DISORDER), RECURRENT SEVERE, WITHOUT PSYCHOSIS (HCC): Status: ACTIVE | Noted: 2025-03-23

## 2025-03-24 LAB
BACTERIA UR CULT: ABNORMAL
ORGANISM: ABNORMAL

## 2025-03-24 PROCEDURE — 6370000000 HC RX 637 (ALT 250 FOR IP): Performed by: PSYCHIATRY & NEUROLOGY

## 2025-03-24 PROCEDURE — APPSS30 APP SPLIT SHARED TIME 16-30 MINUTES: Performed by: PHYSICIAN ASSISTANT

## 2025-03-24 PROCEDURE — 90833 PSYTX W PT W E/M 30 MIN: CPT | Performed by: PSYCHIATRY & NEUROLOGY

## 2025-03-24 PROCEDURE — 99232 SBSQ HOSP IP/OBS MODERATE 35: CPT | Performed by: PSYCHIATRY & NEUROLOGY

## 2025-03-24 PROCEDURE — 1240000000 HC EMOTIONAL WELLNESS R&B

## 2025-03-24 RX ORDER — MIRTAZAPINE 7.5 MG/1
7.5 TABLET, FILM COATED ORAL NIGHTLY
Qty: 30 TABLET | Refills: 0 | Status: SHIPPED | OUTPATIENT
Start: 2025-03-24

## 2025-03-24 RX ADMIN — TRAZODONE HYDROCHLORIDE 50 MG: 50 TABLET ORAL at 20:34

## 2025-03-24 RX ADMIN — MIRTAZAPINE 7.5 MG: 7.5 TABLET, FILM COATED ORAL at 20:31

## 2025-03-24 NOTE — PROGRESS NOTES
Department of Psychiatry  Progress Note     Chief Complaint:  Suicidal ideation with plan to kill self by shooting self     PROGRESS:  Guillaume was seen in his room  He was receptive to interaction and cooperative with the interview  He stated he was doing fine today  He continues to minimize the events that led to his admission.  He blamed his suicidal thoughts and alcohol.  He rated his mood 5 out of 10 with 10 being the best.  He stated that his anxiety and depression were better  Guillaume denied any active suicidal thoughts during the interview.  He was able to contract for safety on the unit  He reported he was feeling more hopeful about his situation  He reported that he has been sleeping good here.  Staff reported he slept 8.5 hours continuous  Appetite has been good  He has been compliant with his medication.  He denied any side effects.  He has not been taking Librium PRN but has been taking his Remeron  He has been isolative to his room and has not been attending groups    Guillaume denied any active alcohol withdrawal symptoms today.  Pulse has been in the 70s-80s but blood pressure has been elevated.  Last night was 143/105.  This morning was 140/87.      Suicidal ideations: denies    Compliance with medications: good   Medication side effects: absent  ROS: Patient has new complaints:  no  Sleep quality: 8.5 hours continuous last night per staff  Attending groups: no      OBJECTIVE      Medications  Current Facility-Administered Medications: acetaminophen (TYLENOL) tablet 650 mg, 650 mg, Oral, Q4H PRN  ibuprofen (ADVIL;MOTRIN) tablet 400 mg, 400 mg, Oral, Q6H PRN  magnesium hydroxide (MILK OF MAGNESIA) 400 MG/5ML suspension 30 mL, 30 mL, Oral, Daily PRN  aluminum & magnesium hydroxide-simethicone (MAALOX PLUS) 200-200-20 MG/5ML suspension 30 mL, 30 mL, Oral, Q6H PRN  hydrOXYzine HCl (ATARAX) tablet 50 mg, 50 mg, Oral, TID PRN  traZODone (DESYREL) tablet 50 mg, 50 mg, Oral, Nightly PRN  chlordiazePOXIDE (LIBRIUM)

## 2025-03-24 NOTE — PROGRESS NOTES
In bed for assessment (see Chart). States he feels better now that he is sober. Discussed coping mechanisms.Hopeful for discharge. Nursing will continue to monitor q 15 minutes and PRN for any change in status/needs.

## 2025-03-24 NOTE — PROGRESS NOTES
Psychosocial Assessment    Current Level of Psychosocial Functioning     Independent XXX  Dependent    Minimal Assist     Comments:      Psychosocial High Risk Factors (check all that apply)    Unable to obtain meds   Chronic illness/pain    Substance abuse XXX  Lack of Family Support   Financial stress XXX  Isolation   Inadequate Community Resources  Suicide attempt(s)  Not taking medications   Victim of crime   Developmental Delay  Unable to manage personal needs    Age 65 or older   Homeless  No transportation   Readmission within 30 days  Unemployment  Traumatic Event    Family/Supports identified: mom and his kids    Sexual Orientation:  Heterosexual    Patient Strengths: wants to receive services, strong support system, stable housing    Patient Barriers: alcohol abuse, not connected to outpatient resources    Safety plan:  Discussed with patient    CMHC/MH history: Patient reports that he was admitted to the unit  a couple of years ago. Per [facility], pt was admitted to the unit on 5/1/2023 due to suicidal ideation. Patient denies history of suicide attempts. Patient denies currently being connected to counseling or psychiatry.     Plan of Care:  medication management, group/individual therapies, family meetings, psycho -education, treatment team meetings to assist with stabilization    Initial Discharge Plan:  Patient to be discharge to his residence with follow up with addiction and mental health services.     Clinical Summary:  Guillaume Whitt is a 40 year old male who was involuntarily admitted to the unit from the ED due to suicidal ideation with a plan. Patient reports that he was \"drunk, emotional, and sad\" Patient states that \"life has been difficult\". Patient was unable to identify particular things that have been making life difficult for him. Patient reports that he started drinking a couple of months ago and has been unable to stop. Patient

## 2025-03-24 NOTE — BH NOTE
INPATIENT RECREATIONAL THERAPY  ADULT BEHAVIORAL SERVICES  EVALUATION    REFERRING PHYSICIAN:  Thuy Mullins MD  DIAGNOSIS:  MDD, recurrent severe, without psychosis   PRECAUTIONS:  standard  HISTORY OF PRESENT ILLNESS/INJURY:    40 y.o. male with a history of polysubstance use who presented to the emergency department on a [facility] for sucidal thoughts with a plan to kill self using a gun  Patient reports that he has been drinking excess alcohol since he got out of [facility] a few weeks ago.  Mentions that he is currently on probation.  Reports that he has been dealing with constant feelings of sad down and depression for last several weeks now.  Reports feeling somewhat helpless and hopeless recently.  Mentions that he got out of     Relationship in December and since then his mood has been down.  Reports constantly spending time drinking alcohol.  Mentions suicidal thoughts are worse when he is drinking alcohol.  At this time is able to contract for safety here in the hospital  PMH:  Please see medical chart for prior medical history, allergies, and medicatio    HISTORY OF PSYCHIATRIC TREATMENT:  Outpatient psychiatric provider:  Patient recently established care with Pieter Cook at [facility]  Suicide attempts: Denies any  Inpatient psychiatric admissions: Patient was briefly admitted to our unit in 2018 and 2023  YOB: 1985    GENDER: male   MARITAL STATUS: single  EMPLOYMENT STATUS:   Patient currently working in a restaurant   LIVING SITUATION/SUPPORT:  Currently lives with his mother's friend in [place]   EDUCATIONAL LEVEL: High school   MEDICATION/DRUG USE:   Alcohol/week: 70.0 standard drinks of alcohol    Types: 70 Shots of liquor per week     Comment: Daily      Social History           Substance and Sexual Activity   Drug Use Yes    Types: Marijuana (Weed)     LEISURE INTERESTS:  watching sports/spending time with friends   ACTIVITY PREFERENCE: no preference   ACTIVITY TYPES:

## 2025-03-24 NOTE — BH NOTE
Pt declined to attend 0900 Community Meeting and Goal group therapy session offered today. Pt was provided maximum verbal encouragement to attend group therapy session. Pt remained resting in bed. No daily goal was obtained.

## 2025-03-24 NOTE — PATIENT CARE CONFERENCE
Behavioral Health  Initial Interdisciplinary Treatment Plan NOTE    REVIEW DATE AND TIME: 3/24/2025 1324    PATIENT was IN TREATMENT TEAM.  See Multidisciplinary Treatment Team sheet for participants.    ADMISSION TYPE:   Admission Type: Involuntary    REASON FOR ADMISSION:  Reason for Admission: BRANDEE. Patient refuses admission assessment.      Estimated Length of Stay Update:  3 to 5 days  Estimated Discharge Date Update: 3/25/2025    Patient Strengths/Barriers  Strengths (Must Choose Two):  (BRANDEE. Patient refuses admission assessment.)  Barriers:  (RBANDEE. Patient refuses admission assessment.)  Addictive Behavior:Addictive Behavior  In the Past 3 Months, Have You Felt or Has Someone Told You That You Have a Problem With  :  (BRANDEE. Patient refuses admission assessment.)  Medical Problems:  Past Medical History:   Diagnosis Date    Anxiety     Depression        EDUCATION:   Learner Progress Toward Treatment Goals: Reviewed results and recommendations of this team, Reviewed group plan and strategies, Reviewed signs, symptoms and risk of self harm and violent behavior, and Reviewed goals and plan of care    Method: Individual    Outcome: Verbalized understanding and Demonstrated Understanding    PATIENT GOALS: \"sleep and leave and stay sober\"    OQ PRIORITIES IDENTIFIED BY THE PATIENT ON ADMISSION: (These are the symptoms that the patient has identified that are impacting them the most at the time of admission based on their own input on the OQ.  These priorities are to be included in all of their care while admitted.)        Refused at time of admission    PLAN/TREATMENT RECOMMENDATIONS UPDATE:   What is the most important thing we can help you with while you are here? See Above  Who is your support system? His mom and kids  Do you have follow-up providers? Denies  Do you have the ability to pay for your medications? No [place] Medicaid  Where will you be residing when you leave the hospital? Pt will return to residence

## 2025-03-24 NOTE — BH NOTE
Pt declined to attend 1100 Recreation group therapy session offered today. Pt was provided maximum verbal encouragement to attend group therapy session. Pt remained resting in bed.

## 2025-03-25 VITALS
DIASTOLIC BLOOD PRESSURE: 82 MMHG | HEIGHT: 73 IN | TEMPERATURE: 96.8 F | WEIGHT: 203 LBS | BODY MASS INDEX: 26.9 KG/M2 | RESPIRATION RATE: 16 BRPM | HEART RATE: 62 BPM | OXYGEN SATURATION: 98 % | SYSTOLIC BLOOD PRESSURE: 124 MMHG

## 2025-03-25 PROCEDURE — 99239 HOSP IP/OBS DSCHRG MGMT >30: CPT | Performed by: PSYCHIATRY & NEUROLOGY

## 2025-03-25 PROCEDURE — 5130000000 HC BRIDGE APPOINTMENT

## 2025-03-25 RX ORDER — TRAZODONE HYDROCHLORIDE 50 MG/1
50 TABLET ORAL NIGHTLY PRN
Qty: 30 TABLET | Refills: 0 | Status: SHIPPED | OUTPATIENT
Start: 2025-03-25

## 2025-03-25 NOTE — DISCHARGE INSTRUCTIONS
Keep all follow-up appointments and take medications as directed.     Call the hope line if needed at :  [facility] [phone].  [phone].  [phone].  [facility] [phone].  [facility] [phone].  [facility] [phone]    Symptoms to report to your Doctor:  Depression  Inability to eat, sleep, or have a bowel movement  Increased sleepiness and lethargy  Voices in your head  Any thoughts of harming self or others    Things to avoid:  Caffeine  Alcohol  No street drugs  Over the counter medications unless Ok'd by your physician or pharmacist.  Driving or operating machinery until full effects of your medications are known.  Driving or operating machinery if dizzy or drowsy from medications.  Use journal as directed.    Education:  Illness and medication teaching was completed.    Discharge Disposition: Patient was discharged to home and was transported by patient walked home. Patient was accompanied by self..      Information sent to next level of care:        __x__Discharge instructions    [facility] [place] Hotline:  [phone]    Crisis phone numbers:  [facility] [phone].  [facility] [phone]  [phone].  [place] [phone].  [place] [phone].  [facility] [phone].    [facility]  [address], [place] [address]  [phone]    [facility]  [address] [place], [place] [address] [place], [place] [address] [phone]    [facility]  [address] [place] [address] [place] [address]  [phone]    [facility]  Recovery and Wellness

## 2025-03-25 NOTE — PROGRESS NOTES
Discharge planning- This clinician staffed case with Simon Antunez navigator. Tulio states that he will call patient regarding first appointment once discharge d/t appointment not being scheduled currently.

## 2025-03-25 NOTE — PLAN OF CARE
Problem: Self Harm/Suicidality  Goal: Will have no self-injury during hospital stay  Description: INTERVENTIONS:  1.  Ensure constant observer at bedside with Q15M safety checks  2.  Maintain a safe environment  3.  Secure patient belongings  4.  Ensure family/visitors adhere to safety recommendations  5.  Ensure safety tray has been added to patient's diet order  6.  Every shift and PRN: Re-assess suicidal risk via Frequent Screener    3/25/2025 1152 by Reema Morrow RN  Outcome: Adequate for Discharge  3/24/2025 2155 by Evans Cobian RN  Outcome: Progressing     Problem: Discharge Planning  Goal: Discharge to home or other facility with appropriate resources  3/25/2025 1152 by Reema Morrow RN  Outcome: Adequate for Discharge  3/24/2025 2155 by Evans Cobian RN  Outcome: Progressing     Problem: Anxiety  Goal: Will report anxiety at manageable levels  Description: INTERVENTIONS:  1. Administer medication as ordered  2. Teach and rehearse alternative coping skills  3. Provide emotional support with 1:1 interaction with staff  3/25/2025 1152 by Reema Morrow RN  Outcome: Adequate for Discharge  3/24/2025 2155 by Evans Cobian RN  Outcome: Progressing     Problem: Drug Abuse/Detox  Goal: Will have no detox symptoms and will verbalize plan for changing drug-related behavior  Description: INTERVENTIONS:  1. Administer medication as ordered  2. Monitor physical status  3. Provide emotional support with 1:1 interaction with staff  4. Encourage  recovery focused treatment   3/25/2025 1152 by Reema Morrow RN  Outcome: Adequate for Discharge  3/24/2025 2155 by Evans Cobian RN  Outcome: Progressing     Problem: Involuntary Admit  Goal: Will cooperate with staff recommendations and doctor's orders and will demonstrate appropriate behavior  Description: INTERVENTIONS:  1. Treat underlying conditions and offer medication as ordered  2. Educate regarding involuntary admission procedures and rules  3. Contain 
  Problem: Self Harm/Suicidality  Goal: Will have no self-injury during hospital stay  Description: INTERVENTIONS:  1.  Ensure constant observer at bedside with Q15M safety checks  2.  Maintain a safe environment  3.  Secure patient belongings  4.  Ensure family/visitors adhere to safety recommendations  5.  Ensure safety tray has been added to patient's diet order  6.  Every shift and PRN: Re-assess suicidal risk via Frequent Screener    Outcome: Progressing  Flowsheets (Taken 3/24/2025 1250)  Will have no self-injury during hospital stay:   Maintain a safe environment   Every shift and PRN: Re-assess suicidal risk via Frequent Screener  Note: No self harm behaviors were observed or reported so far this shift. Remains on every 15 minutes precautions for safety.  Patient denies suicidal ideations at present time       Problem: Discharge Planning  Goal: Discharge to home or other facility with appropriate resources  Outcome: Progressing  Flowsheets (Taken 3/24/2025 1250)  Discharge to home or other facility with appropriate resources:   Identify barriers to discharge with patient and caregiver   Identify discharge learning needs (meds, wound care, etc)   Arrange for needed discharge resources and transportation as appropriate     Problem: Anxiety  Goal: Will report anxiety at manageable levels  Description: INTERVENTIONS:  1. Administer medication as ordered  2. Teach and rehearse alternative coping skills  3. Provide emotional support with 1:1 interaction with staff  Outcome: Progressing  Flowsheets (Taken 3/24/2025 1250)  Will report anxiety at manageable levels:   Administer medication as ordered   Teach and rehearse alternative coping skills   Provide emotional support with 1:1 interaction with staff     Problem: Drug Abuse/Detox  Goal: Will have no detox symptoms and will verbalize plan for changing drug-related behavior  Description: INTERVENTIONS:  1. Administer medication as ordered  2. Monitor physical 
  Problem: Self Harm/Suicidality  Goal: Will have no self-injury during hospital stay  Description: INTERVENTIONS:  1.  Ensure constant observer at bedside with Q15M safety checks  2.  Maintain a safe environment  3.  Secure patient belongings  4.  Ensure family/visitors adhere to safety recommendations  5.  Ensure safety tray has been added to patient's diet order  6.  Every shift and PRN: Re-assess suicidal risk via Frequent Screener    Outcome: Progressing  Note: Pt remained safe and free of harm this shift     Problem: Discharge Planning  Goal: Discharge to home or other facility with appropriate resources  Outcome: Progressing  Note: Patient plans to be discharged home and follow up is unknown     Problem: Anxiety  Goal: Will report anxiety at manageable levels  Description: INTERVENTIONS:  1. Administer medication as ordered  2. Teach and rehearse alternative coping skills  3. Provide emotional support with 1:1 interaction with staff  Outcome: Progressing  Note: Patient states mood 5/10 with low anxiety and depression     Problem: Drug Abuse/Detox  Goal: Will have no detox symptoms and will verbalize plan for changing drug-related behavior  Description: INTERVENTIONS:  1. Administer medication as ordered  2. Monitor physical status  3. Provide emotional support with 1:1 interaction with staff  4. Encourage  recovery focused treatment   Outcome: Progressing  Note: Patient denies any detox or withdrawal symptoms     Problem: Involuntary Admit  Goal: Will cooperate with staff recommendations and doctor's orders and will demonstrate appropriate behavior  Description: INTERVENTIONS:  1. Treat underlying conditions and offer medication as ordered  2. Educate regarding involuntary admission procedures and rules  3. Contain excessive/inappropriate behavior per unit and hospital policies  Outcome: Progressing  Note: Pt is taking medications, attending and participating in groups and care planning. Pt has good 
Cooperative with assessment (see Chart). States he was never \"really\" suicidal. It just happens when he drinks beer. Treatment progressing. Care plan reviewed with patient.  Patient verbalizes understanding of the plan of care and contribute to goal setting.    Problem: Involuntary Admit  Goal: Will cooperate with staff recommendations and doctor's orders and will demonstrate appropriate behavior  Description: INTERVENTIONS:  1. Treat underlying conditions and offer medication as ordered  2. Educate regarding involuntary admission procedures and rules  3. Contain excessive/inappropriate behavior per unit and hospital policies  3/24/2025 2155 by Evans Cobian RN  Outcome: Progressing  3/24/2025 1250 by Fei Castillo RN  Outcome: Not Progressing  Flowsheets (Taken 3/24/2025 1250)  Will cooperate with staff recommendations and doctor's orders and will demonstrate appropriate behavior: Educate regarding involuntary admission procedures and rules     Problem: Risk for Elopement  Goal: Patient will not exit the unit/facility without proper excort  3/24/2025 2155 by Evans Cobian RN  Outcome: Progressing  3/24/2025 1250 by Fei Castillo RN  Outcome: Not Progressing  Flowsheets (Taken 3/24/2025 1250)  Nursing Interventions for Elopement Risk:   Make sure patient has all necessary personal care items   Reduce environmental triggers     Problem: Coping  Goal: Pt/Family able to verbalize concerns and demonstrate effective coping strategies  Description: INTERVENTIONS:  1. Assist patient/family to identify coping skills, available support systems and cultural and spiritual values  2. Provide emotional support, including active listening and acknowledgement of concerns of patient and caregivers  3. Reduce environmental stimuli, as able  4. Instruct patient/family in relaxation techniques, as appropriate  5. Assess for spiritual pain/suffering and initiate Spiritual Care, Psychosocial Clinical Specialist consults as 
caregivers  Note: Pt has attended 0/4 group therapy sessions offered thus far today. Pt has been given maximum verbal encouragement to attend group therapy sessions, pt has been isolative to his room the majority of the day. Intermittently, pt is seen out on the unit interacting with others. Plan to continue to monitor progress and encourage participation in group therapy programming.

## 2025-03-26 NOTE — DISCHARGE SUMMARY
Provider Discharge Summary     Patient ID:  Guillaume Whitt  [id number]  40 y.o.  1985    Admit date: 3/23/2025    Discharge date and time: 3/25/25  3:58 PM     Admitting Physician: Thuy Mullins MD     Discharge Physician: Thuy Mullins MD    Admission Diagnoses: MDD (major depressive disorder), recurrent episode, mild [F33.0]  Depression with suicidal ideation [F32.A, R45.851]  Acute alcoholic intoxication without complication [F10.920]    Discharge Diagnoses:      Severe recurrent major depression without psychotic features (HCC)     Patient Active Problem List   Diagnosis Code    Alcoholic (HCC) F10.20    Substance induced mood disorder (HCC) F19.94    Anxiety F41.9    Major depressive disorder, recurrent episode, severe with anxious distress (HCC) F33.2    Alcohol use disorder F10.90    Depression affecting pregnancy O99.340, F32.A    Depression with suicidal ideation F32.A, R45.851    Bipolar affective disorder, depressed, severe (HCC) F31.4    Severe recurrent major depression without psychotic features (HCC) F33.2        Admission Condition: poor    Discharged Condition: stable    Indication for Admission: threat to self    History of Present Illnes (present tense wording is of findings from admission exam and are not necessarily indicative of current findings):   Guillaume Whitt is a 40 y.o. male with a history of polysubstance use who presented to the emergency department on a [facility] for sucidal thoughts with a plan to kill self using a gun  Patient reports that he has been drinking excess alcohol since he got out of shelter a few weeks ago.  Mentions that he is currently on probation.  Reports that he has been dealing with constant feelings of sad down and depression for last several weeks now.  Reports feeling somewhat helpless and hopeless recently.  Mentions that he got out of     Relationship in December and since then his mood has been down.  Reports constantly spending time drinking

## 2025-04-03 ENCOUNTER — TELEPHONE (OUTPATIENT)
Age: 40
End: 2025-04-03